# Patient Record
Sex: FEMALE | Race: WHITE | NOT HISPANIC OR LATINO | Employment: OTHER | URBAN - METROPOLITAN AREA
[De-identification: names, ages, dates, MRNs, and addresses within clinical notes are randomized per-mention and may not be internally consistent; named-entity substitution may affect disease eponyms.]

---

## 2023-06-11 ENCOUNTER — APPOINTMENT (OUTPATIENT)
Dept: GENERAL RADIOLOGY | Facility: CLINIC | Age: 28
End: 2023-06-11
Attending: EMERGENCY MEDICINE
Payer: COMMERCIAL

## 2023-06-11 ENCOUNTER — HOSPITAL ENCOUNTER (EMERGENCY)
Facility: CLINIC | Age: 28
Discharge: HOME | End: 2023-06-11
Attending: EMERGENCY MEDICINE | Admitting: EMERGENCY MEDICINE
Payer: COMMERCIAL

## 2023-06-11 VITALS
DIASTOLIC BLOOD PRESSURE: 88 MMHG | HEART RATE: 76 BPM | OXYGEN SATURATION: 98 % | SYSTOLIC BLOOD PRESSURE: 130 MMHG | RESPIRATION RATE: 16 BRPM | TEMPERATURE: 98.5 F

## 2023-06-11 DIAGNOSIS — M25.461 EFFUSION OF RIGHT KNEE: ICD-10-CM

## 2023-06-11 LAB
ALBUMIN SERPL BCG-MCNC: 4.3 G/DL (ref 3.5–5.2)
ALP SERPL-CCNC: 146 U/L (ref 35–104)
ALT SERPL W P-5'-P-CCNC: 116 U/L (ref 10–35)
ANION GAP SERPL CALCULATED.3IONS-SCNC: 11 MMOL/L (ref 7–15)
APPEARANCE FLD: ABNORMAL
AST SERPL W P-5'-P-CCNC: 56 U/L (ref 10–35)
BASOPHILS # BLD AUTO: 0.1 10E3/UL (ref 0–0.2)
BASOPHILS NFR BLD AUTO: 1 %
BILIRUB SERPL-MCNC: 0.3 MG/DL
BUN SERPL-MCNC: 12.4 MG/DL (ref 6–20)
CALCIUM SERPL-MCNC: 9.3 MG/DL (ref 8.6–10)
CELL COUNT BODY FLUID SOURCE: ABNORMAL
CHLORIDE SERPL-SCNC: 100 MMOL/L (ref 98–107)
COLOR FLD: ABNORMAL
CREAT SERPL-MCNC: 0.68 MG/DL (ref 0.51–0.95)
CRP SERPL-MCNC: 6.64 MG/L
CRYSTALS SNV MICRO: NORMAL
DEPRECATED HCO3 PLAS-SCNC: 25 MMOL/L (ref 22–29)
EOSINOPHIL # BLD AUTO: 0.2 10E3/UL (ref 0–0.7)
EOSINOPHIL NFR BLD AUTO: 2 %
ERYTHROCYTE [DISTWIDTH] IN BLOOD BY AUTOMATED COUNT: 12.2 % (ref 10–15)
ERYTHROCYTE [SEDIMENTATION RATE] IN BLOOD BY WESTERGREN METHOD: 17 MM/HR (ref 0–20)
GFR SERPL CREATININE-BSD FRML MDRD: >90 ML/MIN/1.73M2
GLUCOSE BODY FLUID SOURCE: NORMAL
GLUCOSE FLD-MCNC: 106 MG/DL
GLUCOSE SERPL-MCNC: 114 MG/DL (ref 70–99)
GRAM STAIN RESULT: NORMAL
HCT VFR BLD AUTO: 38.4 % (ref 35–47)
HGB BLD-MCNC: 13.1 G/DL (ref 11.7–15.7)
IMM GRANULOCYTES # BLD: 0.2 10E3/UL
IMM GRANULOCYTES NFR BLD: 2 %
LYMPHOCYTES # BLD AUTO: 4 10E3/UL (ref 0.8–5.3)
LYMPHOCYTES NFR BLD AUTO: 36 %
LYMPHOCYTES NFR FLD MANUAL: 21 %
MCH RBC QN AUTO: 30.1 PG (ref 26.5–33)
MCHC RBC AUTO-ENTMCNC: 34.1 G/DL (ref 31.5–36.5)
MCV RBC AUTO: 88 FL (ref 78–100)
MONOCYTES # BLD AUTO: 0.7 10E3/UL (ref 0–1.3)
MONOCYTES NFR BLD AUTO: 7 %
MONOS+MACROS NFR FLD MANUAL: 33 %
NEUTROPHILS # BLD AUTO: 5.9 10E3/UL (ref 1.6–8.3)
NEUTROPHILS NFR BLD AUTO: 52 %
NEUTS BAND NFR FLD MANUAL: 46 %
NRBC # BLD AUTO: 0 10E3/UL
NRBC BLD AUTO-RTO: 0 /100
PLATELET # BLD AUTO: 375 10E3/UL (ref 150–450)
POTASSIUM SERPL-SCNC: 3.4 MMOL/L (ref 3.4–5.3)
PROT FLD-MCNC: 5.6 G/DL
PROT SERPL-MCNC: 7.7 G/DL (ref 6.4–8.3)
PROTEIN BODY FLUID SOURCE: NORMAL
RBC # BLD AUTO: 4.35 10E6/UL (ref 3.8–5.2)
SODIUM SERPL-SCNC: 136 MMOL/L (ref 136–145)
WBC # BLD AUTO: 11.1 10E3/UL (ref 4–11)
WBC # FLD AUTO: 3125 /UL

## 2023-06-11 PROCEDURE — 85652 RBC SED RATE AUTOMATED: CPT | Performed by: EMERGENCY MEDICINE

## 2023-06-11 PROCEDURE — 84157 ASSAY OF PROTEIN OTHER: CPT | Performed by: EMERGENCY MEDICINE

## 2023-06-11 PROCEDURE — 86140 C-REACTIVE PROTEIN: CPT | Performed by: EMERGENCY MEDICINE

## 2023-06-11 PROCEDURE — 96374 THER/PROPH/DIAG INJ IV PUSH: CPT

## 2023-06-11 PROCEDURE — 99284 EMERGENCY DEPT VISIT MOD MDM: CPT | Mod: 25

## 2023-06-11 PROCEDURE — 87015 SPECIMEN INFECT AGNT CONCNTJ: CPT | Performed by: EMERGENCY MEDICINE

## 2023-06-11 PROCEDURE — 89051 BODY FLUID CELL COUNT: CPT | Performed by: EMERGENCY MEDICINE

## 2023-06-11 PROCEDURE — 82945 GLUCOSE OTHER FLUID: CPT | Performed by: EMERGENCY MEDICINE

## 2023-06-11 PROCEDURE — 87205 SMEAR GRAM STAIN: CPT | Performed by: EMERGENCY MEDICINE

## 2023-06-11 PROCEDURE — 89060 EXAM SYNOVIAL FLUID CRYSTALS: CPT | Performed by: EMERGENCY MEDICINE

## 2023-06-11 PROCEDURE — 85025 COMPLETE CBC W/AUTO DIFF WBC: CPT | Performed by: EMERGENCY MEDICINE

## 2023-06-11 PROCEDURE — 87070 CULTURE OTHR SPECIMN AEROBIC: CPT | Performed by: EMERGENCY MEDICINE

## 2023-06-11 PROCEDURE — 250N000011 HC RX IP 250 OP 636: Performed by: EMERGENCY MEDICINE

## 2023-06-11 PROCEDURE — 20611 DRAIN/INJ JOINT/BURSA W/US: CPT | Mod: RT

## 2023-06-11 PROCEDURE — 36415 COLL VENOUS BLD VENIPUNCTURE: CPT | Performed by: EMERGENCY MEDICINE

## 2023-06-11 PROCEDURE — 73562 X-RAY EXAM OF KNEE 3: CPT | Mod: RT

## 2023-06-11 PROCEDURE — 80053 COMPREHEN METABOLIC PANEL: CPT | Performed by: EMERGENCY MEDICINE

## 2023-06-11 RX ORDER — KETOROLAC TROMETHAMINE 15 MG/ML
10 INJECTION, SOLUTION INTRAMUSCULAR; INTRAVENOUS ONCE
Status: COMPLETED | OUTPATIENT
Start: 2023-06-11 | End: 2023-06-11

## 2023-06-11 RX ADMIN — KETOROLAC TROMETHAMINE 10 MG: 15 INJECTION, SOLUTION INTRAMUSCULAR; INTRAVENOUS at 05:04

## 2023-06-11 ASSESSMENT — ACTIVITIES OF DAILY LIVING (ADL)
ADLS_ACUITY_SCORE: 35
ADLS_ACUITY_SCORE: 35

## 2023-06-11 NOTE — ED TRIAGE NOTES
Patient here with right knee pain and swelling which started tonight . She denies falling     Triage Assessment     Row Name 06/11/23 0109       Triage Assessment (Adult)    Airway WDL WDL       Respiratory WDL    Respiratory WDL WDL       Skin Circulation/Temperature WDL    Skin Circulation/Temperature WDL WDL       Cardiac WDL    Cardiac WDL WDL       Peripheral/Neurovascular WDL    Peripheral Neurovascular WDL WDL       Cognitive/Neuro/Behavioral WDL    Cognitive/Neuro/Behavioral WDL WDL

## 2023-06-11 NOTE — ED PROVIDER NOTES
History     Chief Complaint:  Knee Pain     The history is provided by the patient.      Wanda Clarke is a 27 year old female with a history of hyperlipidemia and s/p right knee arthroscopy who presents with right knee pain and swelling. The patient has a history of similar symptoms to the right knee, first 6 years ago and then again 2 years ago. The last time she had these symptoms, she underwent an arthroscopy which came back normal except for an excess amount of fluid. 2 days ago, the patient developed some mild swelling to the knee, however last night, this swelling suddenly increased, causing her to have pain around the area, especially with movement, and prompted her to present to the ED for evaluation. Presently, she denies any recent falls or injuries and has had no symptoms in her left knee. Upon arrival to the ED, nursing staff did notice that the knee was warm to the touch but did not see any redness. The patient is not on blood thinners and does not have any other health problems. She also does not currently see an orthopedist or a rheumatologist.     Independent Historian:   None - Patient Only    Medications:    Nexplanon implant     Past Medical History:    Obesity  Hyperlipidemia     Past Surgical History:    Right knee arthroscopy      Physical Exam     Patient Vitals for the past 24 hrs:   BP Temp Temp src Pulse Resp SpO2   06/11/23 0142 130/88 -- -- 76 16 98 %   06/11/23 0108 (!) 124/102 98.5  F (36.9  C) Oral 88 (!) 200 99 %      Physical Exam  General: Resting on the gurney, appears uncomfortable  Head:  The scalp, face, and head appear normal  Mouth/Throat: Mucus membranes are moist  CV:  Regular rate    Normal S1 and S2  No pathological murmur   Resp:  Breath sounds clear and equal bilaterally    Non-labored, no retractions or accessory muscle use    No coarseness    No wheezing   GI:  Abdomen is soft, no rigidity    No tenderness to palpation  MS:  Normal motor assessment of all  extremities.    Good capillary refill noted.  Skin:  Very swollen right knee. No redness or excess warmth.  Patient is unable to ambulate secondary to pain.  Pain with attempted range of motion.  Neuro:   Speech is normal and fluent. No apparent deficit.  Psych: Awake. Alert.  Normal affect.      Appropriate interactions.    Emergency Department Course     Imaging:  XR Knee Right 3 Views   Final Result   IMPRESSION: The bones are well-mineralized. There is a subtle cortical irregularity of the medial aspect of the patella which may reflect a avulsive injury at the medial patellofemoral retinaculum attachment secondary to transient dislocation. There is a    large suprapatellar joint effusion.         Report per radiology    Laboratory:  Labs Ordered and Resulted from Time of ED Arrival to Time of ED Departure   COMPREHENSIVE METABOLIC PANEL - Abnormal       Result Value    Sodium 136      Potassium 3.4      Chloride 100      Carbon Dioxide (CO2) 25      Anion Gap 11      Urea Nitrogen 12.4      Creatinine 0.68      Calcium 9.3      Glucose 114 (*)     Alkaline Phosphatase 146 (*)     AST 56 (*)      (*)     Protein Total 7.7      Albumin 4.3      Bilirubin Total 0.3      GFR Estimate >90     CRP INFLAMMATION - Abnormal    CRP Inflammation 6.64 (*)    CBC WITH PLATELETS AND DIFFERENTIAL - Abnormal    WBC Count 11.1 (*)     RBC Count 4.35      Hemoglobin 13.1      Hematocrit 38.4      MCV 88      MCH 30.1      MCHC 34.1      RDW 12.2      Platelet Count 375      % Neutrophils 52      % Lymphocytes 36      % Monocytes 7      % Eosinophils 2      % Basophils 1      % Immature Granulocytes 2      NRBCs per 100 WBC 0      Absolute Neutrophils 5.9      Absolute Lymphocytes 4.0      Absolute Monocytes 0.7      Absolute Eosinophils 0.2      Absolute Basophils 0.1      Absolute Immature Granulocytes 0.2      Absolute NRBCs 0.0     ERYTHROCYTE SEDIMENTATION RATE AUTO - Normal    Erythrocyte Sedimentation Rate 17      CRYSTAL ID SYNOVIAL FLUID - Normal    Crystals Analysis No clinically significant crystals seen.     GLUCOSE FLUID    Glucose Fluid Source Knee, Right      Glucose fluid 106     PROTEIN FLUID    Protein Fluid Source Knee, Right      Protein Total Fluid 5.6     GRAM STAIN    Gram Stain Result No organisms seen     AEROBIC BACTERIAL CULTURE ROUTINE      Procedures      Arthrocentesis     Procedure: Arthrocentesis    Indication: Joint Swelling    Consent: Verbal from Patient    Universal Protocol: Universal protocol was followed and time out conducted just prior to starting procedure, confirming patient identity, site/side, procedure, patient position, and availability of correct equipment and implants.     Location: Right Knee    Preparation: Povidone-iodine    Anesthesia/Sedation: Lidocaine - 1%    Procedure Detail: The site was prepped and draped in the usual fashion.  A 19 gauge needle was used via the lateral approach.  50mL joint fluid was withdrawn. The fluid was bloody.    Patient Status: The patient tolerated the procedure well: Yes. There were no complications.    Emergency Department Course & Assessments:  Interventions:  Medications   ketorolac (TORADOL) injection 10 mg (10 mg Intravenous $Given 6/11/23 0504)      Assessments:  0159: I performed an exam of the patient and obtained history, as documented above.   0429: I performed an arthrocentesis as documented above.    Consultations/Discussion of Management or Tests:  None     Disposition:  The patient was discharged to home.     Impression & Plan      Medical Decision Making:  Wanda Clarke is a 27 year old female who presents for evaluation of right knee joint swelling.  There is no a history of trauma.  A broad differential for the swelling was of course considered.      Given lack of redness, warmth, and history/exam I felt the most probable etiology was not septic arthritis, septic bursitis, crystal arthropathy.  Xrays showed a large  suprapatellar joint effusion. Given this, an arthrocentesis was performed given risks/benefits.  The results indicate likely not infectious, more likely hemarthrosis vs rheumatologic etiology.   Will send home with orthopedic follow-up. They understand exact reason for the swelling is unclear and they may need return ED visit, further workup, and/or earlier orthopedic consultation.     Diagnosis:    ICD-10-CM    1. Effusion of right knee  M25.461          Scribe Disclosure:  I, Johanna Jean-Baptiste, am serving as a scribe at 1:56 AM on 6/11/2023 to document services personally performed by Florinda Barry MD based on my observations and the provider's statements to me.      6/11/2023   Florinda Barry MD Debroux, Karah M, MD  06/11/23 0756

## 2023-06-16 LAB — BACTERIA SNV CULT: NO GROWTH

## 2023-06-19 ENCOUNTER — MEDICAL CORRESPONDENCE (OUTPATIENT)
Dept: HEALTH INFORMATION MANAGEMENT | Facility: CLINIC | Age: 28
End: 2023-06-19

## 2023-06-19 ENCOUNTER — LAB (OUTPATIENT)
Dept: LAB | Facility: CLINIC | Age: 28
End: 2023-06-19
Payer: COMMERCIAL

## 2023-06-19 DIAGNOSIS — M25.469 KNEE SWELLING: ICD-10-CM

## 2023-06-19 LAB
CRP SERPL-MCNC: 44.39 MG/L
ERYTHROCYTE [SEDIMENTATION RATE] IN BLOOD BY WESTERGREN METHOD: 25 MM/HR (ref 0–20)
URATE SERPL-MCNC: 4.8 MG/DL (ref 2.4–5.7)

## 2023-06-19 PROCEDURE — 86200 CCP ANTIBODY: CPT

## 2023-06-19 PROCEDURE — 86140 C-REACTIVE PROTEIN: CPT

## 2023-06-19 PROCEDURE — 36415 COLL VENOUS BLD VENIPUNCTURE: CPT

## 2023-06-19 PROCEDURE — 86618 LYME DISEASE ANTIBODY: CPT

## 2023-06-19 PROCEDURE — 84550 ASSAY OF BLOOD/URIC ACID: CPT

## 2023-06-19 PROCEDURE — 85652 RBC SED RATE AUTOMATED: CPT

## 2023-06-19 PROCEDURE — 86431 RHEUMATOID FACTOR QUANT: CPT

## 2023-06-19 PROCEDURE — 86038 ANTINUCLEAR ANTIBODIES: CPT

## 2023-06-20 LAB
ANA SER QL IF: NEGATIVE
B BURGDOR IGG+IGM SER QL: 0.22
RHEUMATOID FACT SER NEPH-ACNC: <7 IU/ML

## 2023-06-21 LAB — CCP AB SER IA-ACNC: 0.5 U/ML

## 2023-06-29 ENCOUNTER — APPOINTMENT (OUTPATIENT)
Dept: ULTRASOUND IMAGING | Facility: CLINIC | Age: 28
End: 2023-06-29
Attending: EMERGENCY MEDICINE
Payer: COMMERCIAL

## 2023-06-29 ENCOUNTER — HOSPITAL ENCOUNTER (EMERGENCY)
Facility: CLINIC | Age: 28
Discharge: HOME | End: 2023-06-30
Attending: EMERGENCY MEDICINE
Payer: COMMERCIAL

## 2023-06-29 ENCOUNTER — APPOINTMENT (OUTPATIENT)
Dept: GENERAL RADIOLOGY | Facility: CLINIC | Age: 28
End: 2023-06-29
Attending: EMERGENCY MEDICINE
Payer: COMMERCIAL

## 2023-06-29 DIAGNOSIS — N61.0 MASTITIS: ICD-10-CM

## 2023-06-29 LAB
ALBUMIN SERPL BCG-MCNC: 4.4 G/DL (ref 3.5–5.2)
ALBUMIN UR-MCNC: NEGATIVE MG/DL
ALP SERPL-CCNC: 129 U/L (ref 35–104)
ALT SERPL W P-5'-P-CCNC: 253 U/L (ref 0–50)
ANION GAP SERPL CALCULATED.3IONS-SCNC: 15 MMOL/L (ref 7–15)
APPEARANCE UR: CLEAR
AST SERPL W P-5'-P-CCNC: 127 U/L (ref 0–45)
BASOPHILS # BLD AUTO: 0 10E3/UL (ref 0–0.2)
BASOPHILS NFR BLD AUTO: 0 %
BILIRUB SERPL-MCNC: 0.8 MG/DL
BILIRUB UR QL STRIP: NEGATIVE
BUN SERPL-MCNC: 13.9 MG/DL (ref 6–20)
CALCIUM SERPL-MCNC: 9.1 MG/DL (ref 8.6–10)
CHLORIDE SERPL-SCNC: 100 MMOL/L (ref 98–107)
COLOR UR AUTO: ABNORMAL
CREAT SERPL-MCNC: 0.52 MG/DL (ref 0.51–0.95)
DEPRECATED HCO3 PLAS-SCNC: 22 MMOL/L (ref 22–29)
EOSINOPHIL # BLD AUTO: 0 10E3/UL (ref 0–0.7)
EOSINOPHIL NFR BLD AUTO: 0 %
ERYTHROCYTE [DISTWIDTH] IN BLOOD BY AUTOMATED COUNT: 12.5 % (ref 10–15)
GFR SERPL CREATININE-BSD FRML MDRD: >90 ML/MIN/1.73M2
GLUCOSE SERPL-MCNC: 125 MG/DL (ref 70–99)
GLUCOSE UR STRIP-MCNC: NEGATIVE MG/DL
HCT VFR BLD AUTO: 39.9 % (ref 35–47)
HGB BLD-MCNC: 13.3 G/DL (ref 11.7–15.7)
HGB UR QL STRIP: ABNORMAL
HOLD SPECIMEN: NORMAL
IMM GRANULOCYTES # BLD: 0.1 10E3/UL
IMM GRANULOCYTES NFR BLD: 1 %
KETONES UR STRIP-MCNC: NEGATIVE MG/DL
LACTATE SERPL-SCNC: 2.1 MMOL/L (ref 0.7–2)
LEUKOCYTE ESTERASE UR QL STRIP: NEGATIVE
LYMPHOCYTES # BLD AUTO: 1.5 10E3/UL (ref 0.8–5.3)
LYMPHOCYTES NFR BLD AUTO: 9 %
MCH RBC QN AUTO: 29.9 PG (ref 26.5–33)
MCHC RBC AUTO-ENTMCNC: 33.3 G/DL (ref 31.5–36.5)
MCV RBC AUTO: 90 FL (ref 78–100)
MONOCYTES # BLD AUTO: 0.4 10E3/UL (ref 0–1.3)
MONOCYTES NFR BLD AUTO: 2 %
MUCOUS THREADS #/AREA URNS LPF: PRESENT /LPF
NEUTROPHILS # BLD AUTO: 14.6 10E3/UL (ref 1.6–8.3)
NEUTROPHILS NFR BLD AUTO: 88 %
NITRATE UR QL: NEGATIVE
NRBC # BLD AUTO: 0 10E3/UL
NRBC BLD AUTO-RTO: 0 /100
PH UR STRIP: 5.5 [PH] (ref 5–7)
PLATELET # BLD AUTO: 271 10E3/UL (ref 150–450)
POTASSIUM SERPL-SCNC: 3.5 MMOL/L (ref 3.4–5.3)
PROT SERPL-MCNC: 7.7 G/DL (ref 6.4–8.3)
RBC # BLD AUTO: 4.45 10E6/UL (ref 3.8–5.2)
RBC URINE: 1 /HPF
SODIUM SERPL-SCNC: 137 MMOL/L (ref 136–145)
SP GR UR STRIP: 1.02 (ref 1–1.03)
SQUAMOUS EPITHELIAL: 2 /HPF
UROBILINOGEN UR STRIP-MCNC: NORMAL MG/DL
WBC # BLD AUTO: 16.7 10E3/UL (ref 4–11)
WBC URINE: 1 /HPF

## 2023-06-29 PROCEDURE — 99285 EMERGENCY DEPT VISIT HI MDM: CPT | Mod: 25

## 2023-06-29 PROCEDURE — 36415 COLL VENOUS BLD VENIPUNCTURE: CPT | Performed by: EMERGENCY MEDICINE

## 2023-06-29 PROCEDURE — 96375 TX/PRO/DX INJ NEW DRUG ADDON: CPT

## 2023-06-29 PROCEDURE — 81001 URINALYSIS AUTO W/SCOPE: CPT | Performed by: EMERGENCY MEDICINE

## 2023-06-29 PROCEDURE — 85025 COMPLETE CBC W/AUTO DIFF WBC: CPT | Performed by: EMERGENCY MEDICINE

## 2023-06-29 PROCEDURE — 83605 ASSAY OF LACTIC ACID: CPT | Performed by: EMERGENCY MEDICINE

## 2023-06-29 PROCEDURE — 87040 BLOOD CULTURE FOR BACTERIA: CPT | Mod: XS | Performed by: EMERGENCY MEDICINE

## 2023-06-29 PROCEDURE — 96361 HYDRATE IV INFUSION ADD-ON: CPT

## 2023-06-29 PROCEDURE — 258N000003 HC RX IP 258 OP 636: Performed by: EMERGENCY MEDICINE

## 2023-06-29 PROCEDURE — 83690 ASSAY OF LIPASE: CPT | Performed by: EMERGENCY MEDICINE

## 2023-06-29 PROCEDURE — 80053 COMPREHEN METABOLIC PANEL: CPT | Performed by: EMERGENCY MEDICINE

## 2023-06-29 PROCEDURE — 71046 X-RAY EXAM CHEST 2 VIEWS: CPT

## 2023-06-29 PROCEDURE — 87637 SARSCOV2&INF A&B&RSV AMP PRB: CPT | Performed by: EMERGENCY MEDICINE

## 2023-06-29 PROCEDURE — 76642 ULTRASOUND BREAST LIMITED: CPT | Mod: LT

## 2023-06-29 PROCEDURE — 250N000011 HC RX IP 250 OP 636: Mod: JZ | Performed by: EMERGENCY MEDICINE

## 2023-06-29 PROCEDURE — 96365 THER/PROPH/DIAG IV INF INIT: CPT | Mod: 59

## 2023-06-29 RX ORDER — MORPHINE SULFATE 4 MG/ML
4 INJECTION, SOLUTION INTRAMUSCULAR; INTRAVENOUS
Status: COMPLETED | OUTPATIENT
Start: 2023-06-29 | End: 2023-06-29

## 2023-06-29 RX ORDER — KETOROLAC TROMETHAMINE 15 MG/ML
15 INJECTION, SOLUTION INTRAMUSCULAR; INTRAVENOUS ONCE
Status: COMPLETED | OUTPATIENT
Start: 2023-06-29 | End: 2023-06-29

## 2023-06-29 RX ORDER — CEFTRIAXONE 1 G/1
1 INJECTION, POWDER, FOR SOLUTION INTRAMUSCULAR; INTRAVENOUS ONCE
Status: COMPLETED | OUTPATIENT
Start: 2023-06-29 | End: 2023-06-29

## 2023-06-29 RX ADMIN — CEFTRIAXONE SODIUM 1 G: 1 INJECTION, POWDER, FOR SOLUTION INTRAMUSCULAR; INTRAVENOUS at 23:08

## 2023-06-29 RX ADMIN — KETOROLAC TROMETHAMINE 15 MG: 15 INJECTION, SOLUTION INTRAMUSCULAR; INTRAVENOUS at 22:56

## 2023-06-29 RX ADMIN — MORPHINE SULFATE 4 MG: 4 INJECTION, SOLUTION INTRAMUSCULAR; INTRAVENOUS at 22:53

## 2023-06-29 RX ADMIN — SODIUM CHLORIDE 1000 ML: 9 INJECTION, SOLUTION INTRAVENOUS at 22:51

## 2023-06-29 ASSESSMENT — ACTIVITIES OF DAILY LIVING (ADL): ADLS_ACUITY_SCORE: 33

## 2023-06-30 ENCOUNTER — APPOINTMENT (OUTPATIENT)
Dept: ULTRASOUND IMAGING | Facility: CLINIC | Age: 28
End: 2023-06-30
Attending: EMERGENCY MEDICINE
Payer: COMMERCIAL

## 2023-06-30 ENCOUNTER — APPOINTMENT (OUTPATIENT)
Dept: CT IMAGING | Facility: CLINIC | Age: 28
End: 2023-06-30
Attending: EMERGENCY MEDICINE
Payer: COMMERCIAL

## 2023-06-30 VITALS
SYSTOLIC BLOOD PRESSURE: 101 MMHG | RESPIRATION RATE: 18 BRPM | HEIGHT: 55 IN | TEMPERATURE: 98 F | BODY MASS INDEX: 36.1 KG/M2 | OXYGEN SATURATION: 97 % | WEIGHT: 156 LBS | DIASTOLIC BLOOD PRESSURE: 69 MMHG | HEART RATE: 124 BPM

## 2023-06-30 LAB
FLUAV RNA SPEC QL NAA+PROBE: NEGATIVE
FLUBV RNA RESP QL NAA+PROBE: NEGATIVE
LACTATE SERPL-SCNC: 2.4 MMOL/L (ref 0.7–2)
LIPASE SERPL-CCNC: 17 U/L (ref 13–60)
RSV RNA SPEC NAA+PROBE: NEGATIVE
SARS-COV-2 RNA RESP QL NAA+PROBE: NEGATIVE

## 2023-06-30 PROCEDURE — 74177 CT ABD & PELVIS W/CONTRAST: CPT

## 2023-06-30 PROCEDURE — 83605 ASSAY OF LACTIC ACID: CPT | Performed by: EMERGENCY MEDICINE

## 2023-06-30 PROCEDURE — 96361 HYDRATE IV INFUSION ADD-ON: CPT

## 2023-06-30 PROCEDURE — 96375 TX/PRO/DX INJ NEW DRUG ADDON: CPT | Mod: 59

## 2023-06-30 PROCEDURE — 250N000009 HC RX 250: Performed by: EMERGENCY MEDICINE

## 2023-06-30 PROCEDURE — 250N000011 HC RX IP 250 OP 636: Performed by: EMERGENCY MEDICINE

## 2023-06-30 PROCEDURE — 250N000011 HC RX IP 250 OP 636: Mod: JZ | Performed by: EMERGENCY MEDICINE

## 2023-06-30 PROCEDURE — 36415 COLL VENOUS BLD VENIPUNCTURE: CPT | Performed by: EMERGENCY MEDICINE

## 2023-06-30 PROCEDURE — 258N000003 HC RX IP 258 OP 636: Performed by: EMERGENCY MEDICINE

## 2023-06-30 PROCEDURE — 76705 ECHO EXAM OF ABDOMEN: CPT

## 2023-06-30 RX ORDER — IOPAMIDOL 755 MG/ML
79 INJECTION, SOLUTION INTRAVASCULAR ONCE
Status: COMPLETED | OUTPATIENT
Start: 2023-06-30 | End: 2023-06-30

## 2023-06-30 RX ORDER — CEPHALEXIN 500 MG/1
500 CAPSULE ORAL 4 TIMES DAILY
Qty: 28 CAPSULE | Refills: 0 | Status: SHIPPED | OUTPATIENT
Start: 2023-06-30 | End: 2023-07-07

## 2023-06-30 RX ORDER — ONDANSETRON 2 MG/ML
4 INJECTION INTRAMUSCULAR; INTRAVENOUS EVERY 30 MIN PRN
Status: DISCONTINUED | OUTPATIENT
Start: 2023-06-30 | End: 2023-06-30 | Stop reason: HOSPADM

## 2023-06-30 RX ADMIN — SODIUM CHLORIDE 1000 ML: 9 INJECTION, SOLUTION INTRAVENOUS at 02:14

## 2023-06-30 RX ADMIN — IOPAMIDOL 79 ML: 755 INJECTION, SOLUTION INTRAVENOUS at 04:31

## 2023-06-30 RX ADMIN — SODIUM CHLORIDE 83 ML: 9 INJECTION, SOLUTION INTRAVENOUS at 04:31

## 2023-06-30 RX ADMIN — ONDANSETRON 4 MG: 2 INJECTION INTRAMUSCULAR; INTRAVENOUS at 01:04

## 2023-06-30 ASSESSMENT — ACTIVITIES OF DAILY LIVING (ADL)
ADLS_ACUITY_SCORE: 35

## 2023-06-30 NOTE — ED TRIAGE NOTES
Triage Assessment     Row Name 06/29/23 1352       Triage Assessment (Adult)    Airway WDL WDL       Respiratory WDL    Respiratory WDL WDL       Skin Circulation/Temperature WDL    Skin Circulation/Temperature WDL WDL       Cardiac WDL    Cardiac WDL WDL       Peripheral/Neurovascular WDL    Peripheral Neurovascular WDL WDL       Cognitive/Neuro/Behavioral WDL    Cognitive/Neuro/Behavioral WDL WDL            Left breast pain starting 6 hours ago. Pain is coming from the left breast. Pt. Is breast feeding and denies discharge from nipple. Concurrent fever and chills, no cough. Last tylenol 3 hours ago.

## 2023-06-30 NOTE — ED PROVIDER NOTES
"  History     Chief Complaint:  Left breast pain and fever     HPI   Wanda Clarke is a 27 year old female presents to the emergency department with left-sided breast pain and fever.  Patient reports that she has been breast-feeding her baby but baby's been suffering from some oral lesions and has not been breast-feeding regularly over the past 48 hours.  However she has continued to be pumping.  She notes pumping the last night once and twice today.  However this evening after putting her child on the bed she began having some severe left-sided breast pain.  She is also had some headache, chills and sensation of fever.  She denies any discharge from the nipple.  Denies any redness.  Denies any cough, difficulty breathing, nausea or vomiting.  She has been taking Tylenol for her fever and pain without significant relief.  She has never had similar episodes like this before in the past.  No issues with breast abscesses previously.  She further denies any abdominal pain, urinary symptoms.    Independent Historian:   None - Patient Only    Medications:    cephALEXin (KEFLEX) 500 MG capsule      Past Medical History:    No past medical history on file.    Past Surgical History:    No past surgical history on file.     Physical Exam     Patient Vitals for the past 24 hrs:   BP Temp Temp src Pulse Resp SpO2 Height Weight   06/30/23 0430 101/69 98  F (36.7  C) Oral -- 18 97 % -- --   06/30/23 0045 109/79 99.2  F (37.3  C) Temporal (!) 124 22 97 % -- --   06/29/23 2129 120/73 100.4  F (38  C) Temporal (!) 124 18 99 % 1.346 m (4' 5\") 70.8 kg (156 lb)      Physical Exam  General: Patient is awake, alert  Head: The scalp, face, and head appear normal  Eyes: The pupils are equal, round, and reactive to light. Conjunctivae and sclerae are normal  Neck: Normal range of motion.   CV: Tachycardic but regular  Resp: Lungs are clear without wheezes or rales. No respiratory distress.   GI: Abdomen is soft, no rigidity, " guarding, or rebound. No distension. No tenderness to palpation in any quadrant.   MS: Normal tone. Joints grossly normal without effusions. No asymmetric leg swelling, calf or thigh tenderness.    Breast exam: Female chaperone present. Left breast: Patient has significant focal tenderness in the medial upper quadrant without significant overlying erythema.  No drainage from the nipple. Right breast normal.   Skin: No rash or lesions noted. Normal capillary refill noted  Neuro: Speech is normal and fluent. Face is symmetric. Moving all extremities.   Psych:  Normal affect.  Appropriate interactions.    Emergency Department Course       Imaging:  CT Chest (PE) Abdomen Pelvis w Contrast   Final Result   IMPRESSION:    1.  No acute abnormality identified in the chest, abdomen or pelvis.   2.  No visualized acute pulmonary embolus.    3. Mild to moderate diffuse fatty infiltration of the liver.         US Breast Left Limited 1-3 Quadrants   Final Result   IMPRESSION:       ACR BI-RADS Category      Results given to the patient.      US Abdomen Limited (RUQ)   Final Result   IMPRESSION:   1.  Diffuse hepatic steatosis.   2.  Otherwise unremarkable exam. No biliary dilatation.            XR Chest 2 Views   Final Result   IMPRESSION: Negative chest.         Report per radiology    Laboratory:  Labs Ordered and Resulted from Time of ED Arrival to Time of ED Departure   COMPREHENSIVE METABOLIC PANEL - Abnormal       Result Value    Sodium 137      Potassium 3.5      Chloride 100      Carbon Dioxide (CO2) 22      Anion Gap 15      Urea Nitrogen 13.9      Creatinine 0.52      Calcium 9.1      Glucose 125 (*)     Alkaline Phosphatase 129 (*)      (*)      (*)     Protein Total 7.7      Albumin 4.4      Bilirubin Total 0.8      GFR Estimate >90     CBC WITH PLATELETS AND DIFFERENTIAL - Abnormal    WBC Count 16.7 (*)     RBC Count 4.45      Hemoglobin 13.3      Hematocrit 39.9      MCV 90      MCH 29.9      MCHC  33.3      RDW 12.5      Platelet Count 271      % Neutrophils 88      % Lymphocytes 9      % Monocytes 2      % Eosinophils 0      % Basophils 0      % Immature Granulocytes 1      NRBCs per 100 WBC 0      Absolute Neutrophils 14.6 (*)     Absolute Lymphocytes 1.5      Absolute Monocytes 0.4      Absolute Eosinophils 0.0      Absolute Basophils 0.0      Absolute Immature Granulocytes 0.1      Absolute NRBCs 0.0     LACTIC ACID WHOLE BLOOD - Abnormal    Lactic Acid 2.1 (*)    URINE MACROSCOPIC WITH REFLEX TO MICRO - Abnormal    Color Urine Light Yellow      Appearance Urine Clear      Glucose Urine Negative      Bilirubin Urine Negative      Ketones Urine Negative      Specific Gravity Urine 1.023      Blood Urine Small (*)     pH Urine 5.5      Protein Albumin Urine Negative      Urobilinogen Urine Normal      Nitrite Urine Negative      Leukocyte Esterase Urine Negative      RBC Urine 1      WBC Urine 1      Squamous Epithelials Urine 2 (*)     Mucus Urine Present (*)    LACTIC ACID WHOLE BLOOD - Abnormal    Lactic Acid 2.4 (*)    INFLUENZA A/B, RSV, & SARS-COV2 PCR - Normal    Influenza A PCR Negative      Influenza B PCR Negative      RSV PCR Negative      SARS CoV2 PCR Negative     LIPASE - Normal    Lipase 17     BLOOD CULTURE   BLOOD CULTURE          Emergency Department Course & Assessments:    Interventions:  Medications   ondansetron (ZOFRAN) injection 4 mg (4 mg Intravenous $Given 6/30/23 0104)   0.9% sodium chloride BOLUS (0 mLs Intravenous Stopped 6/30/23 0106)   ketorolac (TORADOL) injection 15 mg (15 mg Intravenous $Given 6/29/23 2256)   morphine (PF) injection 4 mg (4 mg Intravenous $Given 6/29/23 2253)   cefTRIAXone (ROCEPHIN) 1 g vial to attach to  mL bag for ADULTS or NS 50 mL bag for PEDS (0 g Intravenous Stopped 6/29/23 4624)   0.9% sodium chloride BOLUS (0 mLs Intravenous Stopped 6/30/23 7845)   iopamidol (ISOVUE-370) solution 79 mL (79 mLs Intravenous $Given 6/30/23 9421)   Saline Flush  (83 mLs Intravenous $Given 6/30/23 0434)        Disposition:  The patient was discharged to home.     Impression & Plan      Medical Decision Making:  Patient is a normally healthy 27-year-old female who presents to the emergency department with left breast pain, fever and chills that started several hours ago.  Seems most likely that this is due to mastitis but breast abscess and alternative etiologies were considered. Upon initial evaluation she is tachycardic but otherwise hemodynamically stable.  Broad work-up was initiated to investigate the patient's left upper chest pain.  On physical exam she has some focal tenderness in her left breast in the medial upper quadrant.  Ultrasound was obtained which shows no definitive abscess formation.  Blood work revealed evidence of elevated LFTs.  Right upper quadrant ultrasound was obtained which shows no signs of biliary obstruction but does show hepatic steatosis.  No significant tenderness to her abdomen on exam.  Patient will need follow-up with her primary care physician for further work-up of her elevated liver function test.  In review of her chart it does appear that this is somewhat chronic in nature.  CT scan of the chest and pelvis was obtained which did not show further evidence of pneumonia, pulmonary embolism or additional infectious etiology.  Patient was treated with ceftriaxone in the emergency department and will be sent home on Keflex for mastitis.    Diagnosis:    ICD-10-CM    1. Mastitis  N61.0            Discharge Medications:  Discharge Medication List as of 6/30/2023  5:22 AM      START taking these medications    Details   cephALEXin (KEFLEX) 500 MG capsule Take 1 capsule (500 mg) by mouth 4 times daily for 7 days, Disp-28 capsule, R-0, Local Print              MD Socorro Soto, Fernando Moss MD  06/30/23 4028

## 2023-07-05 LAB
BACTERIA BLD CULT: NO GROWTH
BACTERIA BLD CULT: NO GROWTH

## 2023-09-27 ENCOUNTER — TELEPHONE (OUTPATIENT)
Dept: ORTHOPEDICS | Facility: CLINIC | Age: 28
End: 2023-09-27

## 2023-09-27 ENCOUNTER — TRANSCRIBE ORDERS (OUTPATIENT)
Dept: OTHER | Age: 28
End: 2023-09-27

## 2023-09-27 ENCOUNTER — TRANSFERRED RECORDS (OUTPATIENT)
Dept: HEALTH INFORMATION MANAGEMENT | Facility: CLINIC | Age: 28
End: 2023-09-27

## 2023-09-27 DIAGNOSIS — R22.41 MASS OF KNEE, RIGHT: Primary | ICD-10-CM

## 2023-09-27 DIAGNOSIS — R22.41 KNEE MASS, RIGHT: Primary | ICD-10-CM

## 2023-09-27 NOTE — TELEPHONE ENCOUNTER
Writer received an email referral from Dr. Jacob Guidry for rt knee synovial mass.    Kellee Vang LPN

## 2023-09-29 ENCOUNTER — HOSPITAL ENCOUNTER (INPATIENT)
Dept: GENERAL RADIOLOGY | Facility: CLINIC | Age: 28
Discharge: HOME | End: 2023-09-29
Attending: ORTHOPAEDIC SURGERY
Payer: COMMERCIAL

## 2023-09-29 DIAGNOSIS — R22.41 KNEE MASS, RIGHT: ICD-10-CM

## 2023-09-29 PROCEDURE — 73723 MRI JOINT LWR EXTR W/O&W/DYE: CPT | Mod: 26 | Performed by: RADIOLOGY

## 2023-10-13 NOTE — TELEPHONE ENCOUNTER
Action October 13, 2023 12:00 PM MT   Action Taken Sent a request for imaging from [facility] and [facility].  Sent a request for records from [facility].     Action October 18, 2023 8:19 AM MT   Action Taken [facility] imaging resolved. We have the patient's name spelled incorrectly.   Sent another request for records from [facility].      Action October 18, 2023 9:11 AM MT   Action Taken Called [facility], rep will fax over records STAT.       DIAGNOSIS: RT KNEE SYNOVIAL MASS   APPOINTMENT DATE: 10/18/2023   NOTES STATUS DETAILS   OFFICE NOTE from referring provider In process Jacob Guidry MD - [facility]   OFFICE NOTE from other specialist Care Everywhere 06/26/2023 - Rossi Dee PA-C - [facility]    05/11/2023 - [facility]     DISCHARGE REPORT from the ER Care Everywhere  Internal 06/11/2023 - [facility]  05/11/2023 - [facility]   MEDICATION LIST Care Everywhere    LABS     MRI PACS [facility]:  06/09/2023- RT Knee   ULTRASOUND PACS [facility]:  05/11/2023 - RT Lower Extremity   XRAYS (IMAGES & REPORTS) PACS Internal    [facility]:  05/11/2023 - RT Knee

## 2023-10-18 ENCOUNTER — OFFICE VISIT (OUTPATIENT)
Dept: ORTHOPEDICS | Facility: CLINIC | Age: 28
End: 2023-10-18
Payer: COMMERCIAL

## 2023-10-18 ENCOUNTER — PRE VISIT (OUTPATIENT)
Dept: ORTHOPEDICS | Facility: CLINIC | Age: 28
End: 2023-10-18

## 2023-10-18 ENCOUNTER — TELEPHONE (OUTPATIENT)
Dept: ORTHOPEDICS | Facility: CLINIC | Age: 28
End: 2023-10-18

## 2023-10-18 VITALS — HEIGHT: 57 IN | BODY MASS INDEX: 34.73 KG/M2 | WEIGHT: 161 LBS

## 2023-10-18 DIAGNOSIS — R22.41 MASS OF KNEE, RIGHT: ICD-10-CM

## 2023-10-18 PROCEDURE — 99204 OFFICE O/P NEW MOD 45 MIN: CPT | Performed by: ORTHOPAEDIC SURGERY

## 2023-10-18 NOTE — NURSING NOTE
Pre-Op Teaching was done in person at the clinic.    Teaching Flowsheet   Relevant Diagnosis: Pre-Op Teaching  Teaching Topic:      Person(s) involved in teaching:   Patient and      Motivation Level:  Asks Questions: Yes  Eager to Learn: Yes  Cooperative: Yes  Receptive (willing/able to accept information): Yes  Any cultural factors/Protestant beliefs that may influence understanding or compliance? No     Patient demonstrates understanding of the following:  Reason for the appointment, diagnosis and treatment plan: Yes  Knowledge of proper use of medications and conditions for which they are ordered (with special attention to potential side effects or drug interactions): Yes  Which situations necessitate calling provider and whom to contact: Yes- discussed the stoplight tool to help assist with this.      Teaching Concerns Addressed:      Proper use of surgical scrub explain: Yes    Nutritional needs and diet plan: Yes  Pain management techniques: Yes  Wound Care: Yes  How and/when to access community resources: Yes     Instructional Materials Used/Given:  2 bottle of chlorhexidine and a surgery packet given to patient in clinic.      - Important contact info/ phone numbers: emphasizing clinic number and after hours number  - Map/ location of surgery  - Medications to avoid  - Showering instructions  - Stop light tool    Additionally the following was discussed with patient:  -  will be driving the patient to surgery and staying with them for 24 hours.      -Next step: Schedule a surgery date and schedule a Pre-Op appointment with PAC     Time spent with patient: 15 minutes.

## 2023-10-18 NOTE — TELEPHONE ENCOUNTER
Patient is scheduled for surgery with Dr. Gomez    Spoke with: Wanda    Date of Surgery: 10/26/23    Location: [facility]    Informed patient they will need an adult  Yes    Pre op with Provider PAC    Additional imaging/appointments: POP Made    Surgery packet: Received     Additional comments: N/A        Shital Elizabeth on 10/18/2023 at 4:22 PM

## 2023-10-18 NOTE — NURSING NOTE
"Reason For Visit:   Chief Complaint   Patient presents with    Consult     Right knee synovial mass referred by Dr. Jacob Guidry at [facility]       Ht 1.46 m (4' 9.48\")   Wt 73 kg (161 lb)   BMI 34.26 kg/m      Pain Assessment  Patient Currently in Pain: Yes  0-10 Pain Scale: 5  Primary Pain Location: Knee (right)      Ortega Jansen ATC    "

## 2023-10-18 NOTE — PROGRESS NOTES
4 months ago this patient began to have knee swelling.  She said they aspirated blood from her knee on multiple occasions.  She also had some small incisions in the front of the knee but they did not find anything noteworthy.  Its unclear to me if this was a arthroscopic procedure.  This was done in [place].  About 1 month ago the swelling began to come down and her knee has felt somewhat better.  She still has difficulty straightening it and bending it fully.  She has some discomfort on the lateral aspect of the knee that persists.    On examination she is alert oriented has normal mood and affect and is in no acute distress.  Respirations are regular and unlabored eyes are nonicteric.  The right lower extremity there is no edema or erythema.  She has a small effusion and lacks a few degrees of extension.  She can flex the knee only to about 100 degrees or so without discomfort.  She is wearing neoprene type sleeve.    I reviewed her x-rays and MRI.  She has a calcified mass in the lateral aspect of the tibial plateau adjacent to the knee joint.  On the MRI there is some inflammation in this area.  The patient has a minimal effusion on her MRI from 1 month ago and there may be some small areas of synovial thickening in the knee but no large mass.    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  We will arthroscopically examine the knee for other masses or synovitis and I expect that we will have to make an incision to do an open removal of this mass given its size and location.  I have answered all the patient's questions.  There are small risks of bleeding infection pain numbness stiffness weakness and limp.

## 2023-10-18 NOTE — LETTER
10/18/2023         RE: Wanda Clarke  [id number]  [facility] [place] [address]        Dear Colleague,    Thank you for referring your patient, Wanda Clarke, to the [facility] [place]. Please see a copy of my visit note below.    4 months ago this patient began to have knee swelling.  She said they aspirated blood from her knee on multiple occasions.  She also had some small incisions in the front of the knee but they did not find anything noteworthy.  Its unclear to me if this was a arthroscopic procedure.  This was done in [place].  About 1 month ago the swelling began to come down and her knee has felt somewhat better.  She still has difficulty straightening it and bending it fully.  She has some discomfort on the lateral aspect of the knee that persists.    On examination she is alert oriented has normal mood and affect and is in no acute distress.  Respirations are regular and unlabored eyes are nonicteric.  The right lower extremity there is no edema or erythema.  She has a small effusion and lacks a few degrees of extension.  She can flex the knee only to about 100 degrees or so without discomfort.  She is wearing neoprene type sleeve.    I reviewed her x-rays and MRI.  She has a calcified mass in the lateral aspect of the tibial plateau adjacent to the knee joint.  On the MRI there is some inflammation in this area.  The patient has a minimal effusion on her MRI from 1 month ago and there may be some small areas of synovial thickening in the knee but no large mass.    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  We will arthroscopically examine the knee for other masses or synovitis and I expect that we will have to make an incision to do an open removal of this mass given its size and location.  I have answered all the patient's questions.  There are small risks of bleeding infection pain numbness stiffness  weakness and limp.      Artis Gomez MD

## 2023-10-19 NOTE — TELEPHONE ENCOUNTER
FUTURE VISIT INFORMATION      SURGERY INFORMATION:  Date: 10/26/23  Location:  or  Surgeon:  Artis Gomez MD   Anesthesia Type:  general  Procedure: Right knee arthroscopic examination with possible mass excision right EXCISION, MASS, LOWER EXTREMITY     RECORDS REQUESTED FROM:       Primary Care Provider: [facility]

## 2023-10-20 ENCOUNTER — PRE VISIT (OUTPATIENT)
Dept: SURGERY | Facility: CLINIC | Age: 28
End: 2023-10-20

## 2023-10-20 ENCOUNTER — TELEPHONE (OUTPATIENT)
Dept: SURGERY | Facility: CLINIC | Age: 28
End: 2023-10-20

## 2023-10-20 ENCOUNTER — ANESTHESIA EVENT (OUTPATIENT)
Dept: SURGERY | Facility: AMBULATORY SURGERY CENTER | Age: 28
End: 2023-10-20
Payer: COMMERCIAL

## 2023-10-20 ENCOUNTER — LAB (OUTPATIENT)
Dept: LAB | Facility: CLINIC | Age: 28
End: 2023-10-20
Payer: COMMERCIAL

## 2023-10-20 ENCOUNTER — OFFICE VISIT (OUTPATIENT)
Dept: SURGERY | Facility: CLINIC | Age: 28
End: 2023-10-20
Payer: COMMERCIAL

## 2023-10-20 VITALS
OXYGEN SATURATION: 99 % | HEART RATE: 98 BPM | SYSTOLIC BLOOD PRESSURE: 123 MMHG | TEMPERATURE: 98.5 F | BODY MASS INDEX: 33.8 KG/M2 | RESPIRATION RATE: 16 BRPM | DIASTOLIC BLOOD PRESSURE: 85 MMHG | WEIGHT: 161 LBS | HEIGHT: 58 IN

## 2023-10-20 DIAGNOSIS — Z01.818 PREOP EXAMINATION: Primary | ICD-10-CM

## 2023-10-20 DIAGNOSIS — R79.89 ELEVATED LFTS: ICD-10-CM

## 2023-10-20 DIAGNOSIS — R22.41 MASS OF KNEE, RIGHT: ICD-10-CM

## 2023-10-20 DIAGNOSIS — Z01.818 PREOP EXAMINATION: ICD-10-CM

## 2023-10-20 LAB
ALBUMIN SERPL BCG-MCNC: 4.6 G/DL (ref 3.5–5.2)
ALP SERPL-CCNC: 118 U/L (ref 35–104)
ALT SERPL W P-5'-P-CCNC: 169 U/L (ref 0–50)
ANION GAP SERPL CALCULATED.3IONS-SCNC: 11 MMOL/L (ref 7–15)
AST SERPL W P-5'-P-CCNC: 106 U/L (ref 0–45)
BILIRUB SERPL-MCNC: 0.8 MG/DL
BUN SERPL-MCNC: 14 MG/DL (ref 6–20)
CALCIUM SERPL-MCNC: 9.2 MG/DL (ref 8.6–10)
CHLORIDE SERPL-SCNC: 104 MMOL/L (ref 98–107)
CREAT SERPL-MCNC: 0.54 MG/DL (ref 0.51–0.95)
DEPRECATED HCO3 PLAS-SCNC: 24 MMOL/L (ref 22–29)
EGFRCR SERPLBLD CKD-EPI 2021: >90 ML/MIN/1.73M2
ERYTHROCYTE [DISTWIDTH] IN BLOOD BY AUTOMATED COUNT: 12.3 % (ref 10–15)
GLUCOSE SERPL-MCNC: 111 MG/DL (ref 70–99)
HCT VFR BLD AUTO: 40.5 % (ref 35–47)
HGB BLD-MCNC: 14.2 G/DL (ref 11.7–15.7)
INR PPP: 1.1 (ref 0.85–1.15)
MCH RBC QN AUTO: 30 PG (ref 26.5–33)
MCHC RBC AUTO-ENTMCNC: 35.1 G/DL (ref 31.5–36.5)
MCV RBC AUTO: 86 FL (ref 78–100)
PLATELET # BLD AUTO: 323 10E3/UL (ref 150–450)
POTASSIUM SERPL-SCNC: 3.6 MMOL/L (ref 3.4–5.3)
PROT SERPL-MCNC: 7.8 G/DL (ref 6.4–8.3)
RBC # BLD AUTO: 4.73 10E6/UL (ref 3.8–5.2)
SODIUM SERPL-SCNC: 139 MMOL/L (ref 135–145)
WBC # BLD AUTO: 9.9 10E3/UL (ref 4–11)

## 2023-10-20 PROCEDURE — 85610 PROTHROMBIN TIME: CPT | Performed by: PATHOLOGY

## 2023-10-20 PROCEDURE — 99203 OFFICE O/P NEW LOW 30 MIN: CPT | Performed by: CLINICAL NURSE SPECIALIST

## 2023-10-20 PROCEDURE — 36415 COLL VENOUS BLD VENIPUNCTURE: CPT | Performed by: PATHOLOGY

## 2023-10-20 PROCEDURE — 85027 COMPLETE CBC AUTOMATED: CPT | Performed by: PATHOLOGY

## 2023-10-20 PROCEDURE — 80053 COMPREHEN METABOLIC PANEL: CPT | Performed by: PATHOLOGY

## 2023-10-20 ASSESSMENT — PAIN SCALES - GENERAL: PAINLEVEL: NO PAIN (0)

## 2023-10-20 ASSESSMENT — LIFESTYLE VARIABLES: TOBACCO_USE: 0

## 2023-10-20 ASSESSMENT — ENCOUNTER SYMPTOMS
SEIZURES: 0
DYSRHYTHMIAS: 0

## 2023-10-20 NOTE — TELEPHONE ENCOUNTER
"Using a  over the phone, updated Wanda of her lab results.  Per Madyson Mireles, CNS: \"her liver function labs were elevated, but improved from June. Encourage her to follow with a primary provider in the future.\"  Wanda expressed understanding.  Laurence Bella, RN    "

## 2023-10-20 NOTE — H&P
Pre-Operative H & P     CC:  Preoperative exam to assess for increased cardiopulmonary risk while undergoing surgery and anesthesia.    Date of Encounter: 10/20/2023  Primary Care Physician:  No Ref-Primary, Physician     Reason for visit:   Encounter Diagnoses   Name Primary?    Preop examination Yes    Mass of knee, right     Elevated LFTs        HPI  Wanda Clarke is a 28 year old female who presents for pre-operative H & P in preparation for  Procedure Information       Case: [id number] Date/Time: 10/26/23 1355    Procedures:       Right knee arthroscopic examination with possible mass excision right (Right: Knee)      EXCISION, MASS, LOWER EXTREMITY (Right: Leg)    Anesthesia type: General    Diagnosis: Mass of knee, right [R22.41]    Pre-op diagnosis: Mass of knee, right [R22.41]    Location: [place] [address] / [facility]    Providers: Artis Gomez MD          History is obtained from the patient and  via phone , and chart review    Patient who was recently evaluated by Dr. Gomez with concern for right knee swelling beginning approximately 4 months ago. She had aspirations of blood from her knee on multiple occasions in [place]. She also had some type of procedure (arthroscopy) involving small incisions but reportedly nothing significant was found. Approximately one month ago, swelling improved, however she still has difficulty straightening and bending the knee fully. She has some discomfort on the lateral aspect of the knee that persists.    Her MRI was reviewed showing some inflammation involving the knee joint. Per notes she has a calcified mass in the lateral aspect of the tibial plateau adjacent to the knee joint. She has been counseled for above procedures    Patient is breastfeeding. She was treated for left breast mastitis approximately 2 weeks ago.     Hx of abnormal bleeding or anti-platelet use: Denies      Menstrual history: No LMP recorded. Patient has had an implant.     Past Medical History  Past Medical History:   Diagnosis Date    Fatty liver     Mass of right knee     PONV (postoperative nausea and vomiting)        Past Surgical History  Past Surgical History:   Procedure Laterality Date    ARTHROSCOPY KNEE      6-7 years ago       Prior to Admission Medications  No current outpatient medications on file.       Allergies  No Known Allergies    Social History  Social History     Socioeconomic History    Marital status:      Spouse name: Not on file    Number of children: Not on file    Years of education: Not on file    Highest education level: Not on file   Occupational History    Not on file   Tobacco Use    Smoking status: Never    Smokeless tobacco: Never   Substance and Sexual Activity    Alcohol use: Never    Drug use: Never    Sexual activity: Not on file   Other Topics Concern    Not on file   Social History Narrative    Not on file     Social Determinants of Health     Financial Resource Strain: Not on file   Food Insecurity: Not on file   Transportation Needs: Not on file   Physical Activity: Not on file   Stress: Not on file   Social Connections: Not on file   Interpersonal Safety: Not on file   Housing Stability: Not on file       Family History  Family History   Problem Relation Age of Onset    Hypertension Father     Anesthesia Reaction No family hx of     Clotting Disorder No family hx of        Review of Systems  The complete review of systems is negative other than noted in the HPI or here.   Anesthesia Evaluation   Pt has had prior anesthetic. Type: General and Regional (Epidural for delivery).    History of anesthetic complications  - PONV.      ROS/MED HX  ENT/Pulmonary:    (-) tobacco use and recent URI   Neurologic:    (-) no seizures   Cardiovascular:     (+)  - -   -  - -                                 No previous cardiac testing  (-) taking anticoagulants/antiplatelets, LYN  "and arrhythmias   METS/Exercise Tolerance: >4 METS Comment: Activity more limited recently due to knee pain   Hematologic:     (+)       history of blood transfusion, no previous transfusion reaction,     (-) history of blood clots   Musculoskeletal: Comment: Right knee pain/possible mass      GI/Hepatic: Comment: Fatty liver on imaging    (+)             liver disease,    (-) GERD   Renal/Genitourinary:  - neg Renal ROS     Endo: Comment: A1C 5.3    (+)               Obesity,       Psychiatric/Substance Use:  - neg psychiatric ROS     Infectious Disease:  - neg infectious disease ROS     Malignancy:  - neg malignancy ROS     Other:  - neg other ROS          /85 (BP Location: Right arm, Patient Position: Sitting, Cuff Size: Adult Regular)   Pulse 98   Temp 98.5  F (36.9  C) (Oral)   Resp 16   Ht 1.473 m (4' 10\")   Wt 73 kg (161 lb)   SpO2 99%   Breastfeeding Yes   BMI 33.65 kg/m      Physical Exam   Constitutional: Awake, alert, cooperative, no apparent distress, and appears stated age. Accompanied by  and daughter.  Eyes: Pupils equal, round and reactive to light, extra ocular muscles intact, sclera clear, conjunctiva normal.  HENT: Normocephalic, oral pharynx with moist mucus membranes, good dentition. No goiter appreciated.   Respiratory: Clear to auscultation bilaterally, no crackles or wheezing. No cough or obvious dyspnea.  Cardiovascular: Regular rate and rhythm, normal S1 and S2, and no murmur noted. Carotids +2, no bruits. No edema. Palpable pulses to radial  DP and PT arteries.   GI: Normal bowel sounds, soft, non-distended, non-tender, no masses palpated, no hepatosplenomegaly.    Lymph/Hematologic: No cervical lymphadenopathy and no supraclavicular lymphadenopathy.  Genitourinary:  Deferred  Skin: Warm and dry.  No rashes at anticipated surgical site.   Musculoskeletal: Full ROM of neck. There is no redness, warmth, or swelling of the joints. Gross motor strength is normal.  "   Neurologic: Awake, alert, oriented to name, place and time. Cranial nerves II-XII are grossly intact. Gait is normal.   Neuropsychiatric: Calm, cooperative. Normal affect.     Prior Labs/Diagnostic Studies   All labs and imaging personally reviewed   Lab Results   Component Value Date    WBC 16.7 06/29/2023     Lab Results   Component Value Date    RBC 4.45 06/29/2023     Lab Results   Component Value Date    HGB 13.3 06/29/2023     Lab Results   Component Value Date    HCT 39.9 06/29/2023     Lab Results   Component Value Date    MCV 90 06/29/2023     Lab Results   Component Value Date    MCH 29.9 06/29/2023     Lab Results   Component Value Date    MCHC 33.3 06/29/2023     Lab Results   Component Value Date    RDW 12.5 06/29/2023     Lab Results   Component Value Date     06/29/2023     Last Comprehensive Metabolic Panel:  Sodium   Date Value Ref Range Status   06/29/2023 137 136 - 145 mmol/L Final     Potassium   Date Value Ref Range Status   06/29/2023 3.5 3.4 - 5.3 mmol/L Final     Chloride   Date Value Ref Range Status   06/29/2023 100 98 - 107 mmol/L Final     Carbon Dioxide (CO2)   Date Value Ref Range Status   06/29/2023 22 22 - 29 mmol/L Final     Anion Gap   Date Value Ref Range Status   06/29/2023 15 7 - 15 mmol/L Final     Glucose   Date Value Ref Range Status   06/29/2023 125 (H) 70 - 99 mg/dL Final     Urea Nitrogen   Date Value Ref Range Status   06/29/2023 13.9 6.0 - 20.0 mg/dL Final     Creatinine   Date Value Ref Range Status   06/29/2023 0.52 0.51 - 0.95 mg/dL Final     GFR Estimate   Date Value Ref Range Status   06/29/2023 >90 >60 mL/min/1.73m2 Final     Calcium   Date Value Ref Range Status   06/29/2023 9.1 8.6 - 10.0 mg/dL Final     Bilirubin Total   Date Value Ref Range Status   06/29/2023 0.8 <=1.2 mg/dL Final     Alkaline Phosphatase   Date Value Ref Range Status   06/29/2023 129 (H) 35 - 104 U/L Final     ALT   Date Value Ref Range Status   06/29/2023 253 (H) 0 - 50 U/L Final      Comment:     Reference intervals for this test were updated on 6/12/2023 to more accurately reflect our healthy population. There may be differences in the flagging of prior results with similar values performed with this method. Interpretation of those prior results can be made in the context of the updated reference intervals.       AST   Date Value Ref Range Status   06/29/2023 127 (H) 0 - 45 U/L Final     Comment:     Reference intervals for this test were updated on 6/12/2023 to more accurately reflect our healthy population. There may be differences in the flagging of prior results with similar values performed with this method. Interpretation of those prior results can be made in the context of the updated reference intervals.     EKG: Not indicated    MR King Overread 9/29/23                                                                     IMPRESSION:      1. Calcific focus along the tibiofibular articulation measuring 1.6 cm  with associated synovial thickening and enhancement, likely a joint  body extending along the popliteus myotendinous junction. This focus  appears ossific on radiographs. Enhancement on sagittal series 12  image 24 suggests adjacent synovitis, possibly reactive.      2. 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    CT Chest PE 6/30/23                                                                     IMPRESSION:   1.  No acute abnormality identified in the chest, abdomen or pelvis.  2.  No visualized acute pulmonary embolus.   3. Mild to moderate diffuse fatty infiltration of the liver.     The patient's records and results personally reviewed by this provider.     Outside records reviewed from: Care Everywhere    LAB/DIAGNOSTIC STUDIES TODAY:    Lab Results   Component Value Date    WBC 9.9 10/20/2023     Lab Results   Component Value Date    RBC 4.73 10/20/2023     Lab Results   Component Value Date    HGB 14.2 10/20/2023     Lab  "Results   Component Value Date    HCT 40.5 10/20/2023     Lab Results   Component Value Date    MCV 86 10/20/2023     Lab Results   Component Value Date    MCH 30.0 10/20/2023     Lab Results   Component Value Date    MCHC 35.1 10/20/2023     Lab Results   Component Value Date    RDW 12.3 10/20/2023     Lab Results   Component Value Date     10/20/2023     Last Comprehensive Metabolic Panel:  Lab Results   Component Value Date     10/20/2023    POTASSIUM 3.6 10/20/2023    CHLORIDE 104 10/20/2023    CO2 24 10/20/2023    ANIONGAP 11 10/20/2023     (H) 10/20/2023    BUN 14.0 10/20/2023    CR 0.54 10/20/2023    GFRESTIMATED >90 10/20/2023    JOSEFA 9.2 10/20/2023     Lab Results   Component Value Date     10/20/2023     Lab Results   Component Value Date     10/20/2023     No results found for: \"BILICONJ\"   Lab Results   Component Value Date    BILITOTAL 0.8 10/20/2023     Lab Results   Component Value Date    ALBUMIN 4.6 10/20/2023     Lab Results   Component Value Date    PROTTOTAL 7.8 10/20/2023      Lab Results   Component Value Date    ALKPHOS 118 10/20/2023       INR 1.10  Assessment    Wanda Clarke is a 28 year old female seen as a PAC referral for risk assessment and optimization for anesthesia.    Plan/Recommendations  Pt will be optimized for the proposed procedure.  See below for details on the assessment, risk, and preoperative recommendations    NEUROLOGY  - No history of TIA, CVA or seizure    -Post Op delirium risk factors:  No risk identified    ENT  - No current airway concerns.  Will need to be reassessed day of surgery.  Mallampati: I  TM: > 3    CARDIAC  No known cardiac history, symptoms or meds. Good activity tolerance, but now more limited due to knee pain  - METS (Metabolic Equivalents)>4    RCRI: 0.4% risk of serious cardiac events    PULMONARY  Denies asthma, cough or shortness of breath  Low risk for MANUELITO  - Tobacco History    History   Smoking Status " "   Never   Smokeless Tobacco    Never       GI: Occasional reflux/heartburn related to certain foods. No specific meds.   Fatty liver on imaging.   Elevated LFTs in 6/2023, noted since 2020  Today still elevated but somewhat improved from last  Alk Phos 118      INR 1.10    Patient will be informed of results and encourage her to continue to follow up with a primary provider  PONV High Risk  Total Score: 3           1 AN PONV: Pt is Female    1 AN PONV: Patient is not a current smoker    1 AN PONV: Patient has history of PONV      PONV. Significant history. Final decisions regarding prophylaxis by Anesthesia on DOS.    /RENAL  - Baseline Creatinine  0.54    ENDOCRINE    - BMI: Estimated body mass index is 33.65 kg/m  as calculated from the following:    Height as of this encounter: 1.473 m (4' 10\").    Weight as of this encounter: 73 kg (161 lb).  Obesity (BMI >30)  - No history of Diabetes Mellitus 3/2/23 A1C 5.3    HEME  VTE Low Risk 0.26%            Total Score: 0      Denies personal or family history of blood clots  Denies history of blood transfusion     Will require     Different anesthesia methods/types have been discussed with the patient, but they are aware that the final plan will be decided by the assigned anesthesia provider on the date of service.  Patient was discussed with Dr Pryor    The patient is optimized for their procedure. AVS with information on surgery time/arrival time, meds and NPO status given by nursing staff. No further diagnostic testing indicated.      On the day of service:     Prep time: 12 minutes  Visit time: 16 minutes  Documentation time: 14 minutes  ------------------------------------------  Total time: 42 minutes      ODALYS Martins CNS  Preoperative Assessment Center  [facility]  [facility] and [facility]  Phone: [phone]  Fax: [phone]    "

## 2023-10-20 NOTE — PATIENT INSTRUCTIONS
Preparing for Your Surgery      Name:  Wanda Clarke   MRN:  [id number]   :  1995   Today's Date:  10/20/2023         Arriving for surgery:  Surgery date:  10/26/23  Arrival time:  12:30 pm   Surgery time: 2:00 pm     Restrictions due to COVID 19:    Please maintain social distance.  Masking is optional        parking is available for anyone with mobility limitations or disabilities. (Monday- Friday 7 am- 5 pm)    Please come to:    [facility]  [address] [place] [id number]    Check in on the 5th floor, Ambulatory Surgery Center.    What can I eat or drink?    -  You may eat and drink normally until 8 hours before arrival time  (Until 4:30 am on 10/26/23)  -  You may have clear liquids until 2 hours before arrival time  (Until 10:30 am on 10/26/23)    Examples of clear liquids:  Water  Clear broth  Juices (apple, white grape, white cranberry  and cider) without pulp  Noncarbonated, powder based beverages  (lemonade and Nikhil-Aid)  Sodas (Sprite, 7-Up, ginger ale and seltzer)  Coffee or tea (without milk or cream)  Gatorade    --No alcohol or cannabis products for at least 24 hours before surgery    Which medicines can I take?      Hold Ibuprofen (Advil, Motrin) for 1 day before surgery--unless otherwise directed by surgeon.  Hold Naproxen (Aleve) for 4 days before surgery.        How do I prepare myself?  - Please take 2 showers (one the night prior to surgery and one the morning of surgery) using Scrubcare or Hibiclens soap.    Use this soap only from the neck to your toes.     Leave the soap on your skin for one minute--then rinse thoroughly.      You may use your own shampoo and conditioner; no other hair products.   - Please remove all jewelry and body piercings.  - No lotions, deodorants or fragrance.  - No makeup or fingernail polish.   - Bring your ID and insurance card.    -If you have a Deep Brain Stimulator, a Spinal Cord Stimulator or any  implanted Neuro device you must bring the remote to the Surgery Center          ALL PATIENTS ARE REQUIRED TO HAVE A RESPONSIBLE ADULT TO DRIVE AND BE IN ATTENDANCE WITH THEM FOR 24 HOURS FOLLOWING SURGERY       Covid testing policy as of 12/06/2022  Your surgeon will notify and schedule you for a COVID test if one is needed before surgery--please direct any questions or COVID symptoms to your surgeon      Questions or Concerns:    -For questions regarding the day of surgery please contact the Ambulatory Surgery Center at [phone].    -If you have health changes between today and your surgery please contact your surgeon.     For questions after surgery please call your surgeons office.

## 2023-10-25 ASSESSMENT — ENCOUNTER SYMPTOMS
DYSRHYTHMIAS: 0
SEIZURES: 0

## 2023-10-25 ASSESSMENT — LIFESTYLE VARIABLES: TOBACCO_USE: 0

## 2023-10-26 ENCOUNTER — HOSPITAL ENCOUNTER (OUTPATIENT)
Facility: AMBULATORY SURGERY CENTER | Age: 28
Discharge: HOME | End: 2023-10-26
Attending: ORTHOPAEDIC SURGERY
Payer: COMMERCIAL

## 2023-10-26 ENCOUNTER — ANESTHESIA (OUTPATIENT)
Dept: SURGERY | Facility: AMBULATORY SURGERY CENTER | Age: 28
End: 2023-10-26
Payer: COMMERCIAL

## 2023-10-26 VITALS
OXYGEN SATURATION: 97 % | WEIGHT: 161 LBS | HEART RATE: 75 BPM | HEIGHT: 58 IN | RESPIRATION RATE: 16 BRPM | TEMPERATURE: 97.1 F | SYSTOLIC BLOOD PRESSURE: 111 MMHG | BODY MASS INDEX: 33.8 KG/M2 | DIASTOLIC BLOOD PRESSURE: 82 MMHG

## 2023-10-26 DIAGNOSIS — R22.41 MASS OF KNEE, RIGHT: Primary | ICD-10-CM

## 2023-10-26 LAB
HCG UR QL: NEGATIVE
INTERNAL QC OK POCT: NORMAL
POCT KIT EXPIRATION DATE: NORMAL
POCT KIT LOT NUMBER: NORMAL

## 2023-10-26 PROCEDURE — 81025 URINE PREGNANCY TEST: CPT | Performed by: PATHOLOGY

## 2023-10-26 PROCEDURE — 88305 TISSUE EXAM BY PATHOLOGIST: CPT | Mod: 26 | Performed by: PATHOLOGY

## 2023-10-26 PROCEDURE — 88307 TISSUE EXAM BY PATHOLOGIST: CPT | Mod: 26 | Performed by: PATHOLOGY

## 2023-10-26 PROCEDURE — 27372 REMOVAL OF FOREIGN BODY: CPT | Mod: 51,XS,RT | Performed by: ORTHOPAEDIC SURGERY

## 2023-10-26 PROCEDURE — 29876 ARTHRS KNEE SURG SYNVCT MAJ: CPT | Mod: RT | Performed by: ORTHOPAEDIC SURGERY

## 2023-10-26 PROCEDURE — 88311 DECALCIFY TISSUE: CPT | Mod: 26 | Performed by: PATHOLOGY

## 2023-10-26 PROCEDURE — 88311 DECALCIFY TISSUE: CPT | Mod: TC | Performed by: ORTHOPAEDIC SURGERY

## 2023-10-26 PROCEDURE — 88305 TISSUE EXAM BY PATHOLOGIST: CPT | Mod: TC | Performed by: ORTHOPAEDIC SURGERY

## 2023-10-26 RX ORDER — LIDOCAINE 40 MG/G
CREAM TOPICAL
Status: DISCONTINUED | OUTPATIENT
Start: 2023-10-26 | End: 2023-10-26 | Stop reason: HOSPADM

## 2023-10-26 RX ORDER — HYDROMORPHONE HYDROCHLORIDE 1 MG/ML
0.4 INJECTION, SOLUTION INTRAMUSCULAR; INTRAVENOUS; SUBCUTANEOUS EVERY 5 MIN PRN
Status: DISCONTINUED | OUTPATIENT
Start: 2023-10-26 | End: 2023-10-26 | Stop reason: HOSPADM

## 2023-10-26 RX ORDER — HYDROMORPHONE HYDROCHLORIDE 1 MG/ML
0.2 INJECTION, SOLUTION INTRAMUSCULAR; INTRAVENOUS; SUBCUTANEOUS EVERY 5 MIN PRN
Status: DISCONTINUED | OUTPATIENT
Start: 2023-10-26 | End: 2023-10-26 | Stop reason: HOSPADM

## 2023-10-26 RX ORDER — LIDOCAINE HYDROCHLORIDE 20 MG/ML
INJECTION, SOLUTION INFILTRATION; PERINEURAL PRN
Status: DISCONTINUED | OUTPATIENT
Start: 2023-10-26 | End: 2023-10-26

## 2023-10-26 RX ORDER — ACETAMINOPHEN 325 MG/1
975 TABLET ORAL EVERY 8 HOURS PRN
Qty: 50 TABLET | Refills: 0 | Status: SHIPPED | OUTPATIENT
Start: 2023-10-26

## 2023-10-26 RX ORDER — PROPOFOL 10 MG/ML
INJECTION, EMULSION INTRAVENOUS PRN
Status: DISCONTINUED | OUTPATIENT
Start: 2023-10-26 | End: 2023-10-26

## 2023-10-26 RX ORDER — KETOROLAC TROMETHAMINE 30 MG/ML
INJECTION, SOLUTION INTRAMUSCULAR; INTRAVENOUS PRN
Status: DISCONTINUED | OUTPATIENT
Start: 2023-10-26 | End: 2023-10-26

## 2023-10-26 RX ORDER — CEFAZOLIN SODIUM 2 G/50ML
2 SOLUTION INTRAVENOUS
Status: COMPLETED | OUTPATIENT
Start: 2023-10-26 | End: 2023-10-26

## 2023-10-26 RX ORDER — ONDANSETRON 4 MG/1
4 TABLET, ORALLY DISINTEGRATING ORAL EVERY 30 MIN PRN
Status: DISCONTINUED | OUTPATIENT
Start: 2023-10-26 | End: 2023-10-27 | Stop reason: HOSPADM

## 2023-10-26 RX ORDER — OXYCODONE HYDROCHLORIDE 5 MG/1
5 TABLET ORAL
Status: COMPLETED | OUTPATIENT
Start: 2023-10-26 | End: 2023-10-26

## 2023-10-26 RX ORDER — APREPITANT 40 MG/1
40 CAPSULE ORAL ONCE
Status: COMPLETED | OUTPATIENT
Start: 2023-10-26 | End: 2023-10-26

## 2023-10-26 RX ORDER — BUPIVACAINE HYDROCHLORIDE 2.5 MG/ML
INJECTION, SOLUTION INFILTRATION; PERINEURAL PRN
Status: DISCONTINUED | OUTPATIENT
Start: 2023-10-26 | End: 2023-10-26 | Stop reason: HOSPADM

## 2023-10-26 RX ORDER — IBUPROFEN 600 MG/1
600 TABLET, FILM COATED ORAL EVERY 6 HOURS PRN
Qty: 40 TABLET | Refills: 0 | Status: SHIPPED | OUTPATIENT
Start: 2023-10-26

## 2023-10-26 RX ORDER — OXYCODONE HYDROCHLORIDE 5 MG/1
10 TABLET ORAL
Status: DISCONTINUED | OUTPATIENT
Start: 2023-10-26 | End: 2023-10-27 | Stop reason: HOSPADM

## 2023-10-26 RX ORDER — PROPOFOL 10 MG/ML
INJECTION, EMULSION INTRAVENOUS CONTINUOUS PRN
Status: DISCONTINUED | OUTPATIENT
Start: 2023-10-26 | End: 2023-10-26

## 2023-10-26 RX ORDER — CEFAZOLIN SODIUM 2 G/50ML
2 SOLUTION INTRAVENOUS SEE ADMIN INSTRUCTIONS
Status: DISCONTINUED | OUTPATIENT
Start: 2023-10-26 | End: 2023-10-26 | Stop reason: HOSPADM

## 2023-10-26 RX ORDER — SODIUM CHLORIDE, SODIUM LACTATE, POTASSIUM CHLORIDE, CALCIUM CHLORIDE 600; 310; 30; 20 MG/100ML; MG/100ML; MG/100ML; MG/100ML
INJECTION, SOLUTION INTRAVENOUS CONTINUOUS
Status: DISCONTINUED | OUTPATIENT
Start: 2023-10-26 | End: 2023-10-26 | Stop reason: HOSPADM

## 2023-10-26 RX ORDER — ONDANSETRON 2 MG/ML
INJECTION INTRAMUSCULAR; INTRAVENOUS PRN
Status: DISCONTINUED | OUTPATIENT
Start: 2023-10-26 | End: 2023-10-26

## 2023-10-26 RX ORDER — ONDANSETRON 2 MG/ML
4 INJECTION INTRAMUSCULAR; INTRAVENOUS EVERY 30 MIN PRN
Status: DISCONTINUED | OUTPATIENT
Start: 2023-10-26 | End: 2023-10-27 | Stop reason: HOSPADM

## 2023-10-26 RX ORDER — ONDANSETRON 2 MG/ML
4 INJECTION INTRAMUSCULAR; INTRAVENOUS EVERY 30 MIN PRN
Status: DISCONTINUED | OUTPATIENT
Start: 2023-10-26 | End: 2023-10-26 | Stop reason: HOSPADM

## 2023-10-26 RX ORDER — ACETAMINOPHEN 325 MG/1
975 TABLET ORAL ONCE
Status: COMPLETED | OUTPATIENT
Start: 2023-10-26 | End: 2023-10-26

## 2023-10-26 RX ORDER — ONDANSETRON 4 MG/1
4 TABLET, ORALLY DISINTEGRATING ORAL EVERY 30 MIN PRN
Status: DISCONTINUED | OUTPATIENT
Start: 2023-10-26 | End: 2023-10-26 | Stop reason: HOSPADM

## 2023-10-26 RX ORDER — FENTANYL CITRATE 50 UG/ML
25 INJECTION, SOLUTION INTRAMUSCULAR; INTRAVENOUS EVERY 5 MIN PRN
Status: DISCONTINUED | OUTPATIENT
Start: 2023-10-26 | End: 2023-10-26 | Stop reason: HOSPADM

## 2023-10-26 RX ORDER — FENTANYL CITRATE 50 UG/ML
INJECTION, SOLUTION INTRAMUSCULAR; INTRAVENOUS PRN
Status: DISCONTINUED | OUTPATIENT
Start: 2023-10-26 | End: 2023-10-26

## 2023-10-26 RX ORDER — DEXAMETHASONE SODIUM PHOSPHATE 4 MG/ML
INJECTION, SOLUTION INTRA-ARTICULAR; INTRALESIONAL; INTRAMUSCULAR; INTRAVENOUS; SOFT TISSUE PRN
Status: DISCONTINUED | OUTPATIENT
Start: 2023-10-26 | End: 2023-10-26

## 2023-10-26 RX ORDER — OXYCODONE HYDROCHLORIDE 5 MG/1
5 TABLET ORAL EVERY 6 HOURS PRN
Qty: 10 TABLET | Refills: 0 | Status: SHIPPED | OUTPATIENT
Start: 2023-10-26 | End: 2023-10-29

## 2023-10-26 RX ORDER — FENTANYL CITRATE 50 UG/ML
50 INJECTION, SOLUTION INTRAMUSCULAR; INTRAVENOUS EVERY 5 MIN PRN
Status: DISCONTINUED | OUTPATIENT
Start: 2023-10-26 | End: 2023-10-26 | Stop reason: HOSPADM

## 2023-10-26 RX ADMIN — SODIUM CHLORIDE, SODIUM LACTATE, POTASSIUM CHLORIDE, CALCIUM CHLORIDE: 600; 310; 30; 20 INJECTION, SOLUTION INTRAVENOUS at 14:10

## 2023-10-26 RX ADMIN — PROPOFOL 200 MCG/KG/MIN: 10 INJECTION, EMULSION INTRAVENOUS at 14:16

## 2023-10-26 RX ADMIN — LIDOCAINE HYDROCHLORIDE 80 MG: 20 INJECTION, SOLUTION INFILTRATION; PERINEURAL at 14:15

## 2023-10-26 RX ADMIN — CEFAZOLIN SODIUM 2 G: 2 SOLUTION INTRAVENOUS at 14:09

## 2023-10-26 RX ADMIN — KETOROLAC TROMETHAMINE 30 MG: 30 INJECTION, SOLUTION INTRAMUSCULAR; INTRAVENOUS at 15:29

## 2023-10-26 RX ADMIN — ACETAMINOPHEN 975 MG: 325 TABLET ORAL at 13:20

## 2023-10-26 RX ADMIN — DEXAMETHASONE SODIUM PHOSPHATE 4 MG: 4 INJECTION, SOLUTION INTRA-ARTICULAR; INTRALESIONAL; INTRAMUSCULAR; INTRAVENOUS; SOFT TISSUE at 14:16

## 2023-10-26 RX ADMIN — ONDANSETRON 4 MG: 2 INJECTION INTRAMUSCULAR; INTRAVENOUS at 14:20

## 2023-10-26 RX ADMIN — FENTANYL CITRATE 25 MCG: 50 INJECTION, SOLUTION INTRAMUSCULAR; INTRAVENOUS at 16:35

## 2023-10-26 RX ADMIN — FENTANYL CITRATE 25 MCG: 50 INJECTION, SOLUTION INTRAMUSCULAR; INTRAVENOUS at 16:40

## 2023-10-26 RX ADMIN — FENTANYL CITRATE 50 MCG: 50 INJECTION, SOLUTION INTRAMUSCULAR; INTRAVENOUS at 14:47

## 2023-10-26 RX ADMIN — FENTANYL CITRATE 50 MCG: 50 INJECTION, SOLUTION INTRAMUSCULAR; INTRAVENOUS at 15:18

## 2023-10-26 RX ADMIN — PROPOFOL 200 MG: 10 INJECTION, EMULSION INTRAVENOUS at 14:15

## 2023-10-26 RX ADMIN — FENTANYL CITRATE 25 MCG: 50 INJECTION, SOLUTION INTRAMUSCULAR; INTRAVENOUS at 14:36

## 2023-10-26 RX ADMIN — APREPITANT 40 MG: 40 CAPSULE ORAL at 13:50

## 2023-10-26 RX ADMIN — FENTANYL CITRATE 25 MCG: 50 INJECTION, SOLUTION INTRAMUSCULAR; INTRAVENOUS at 14:15

## 2023-10-26 RX ADMIN — FENTANYL CITRATE 25 MCG: 50 INJECTION, SOLUTION INTRAMUSCULAR; INTRAVENOUS at 15:59

## 2023-10-26 RX ADMIN — OXYCODONE HYDROCHLORIDE 5 MG: 5 TABLET ORAL at 16:27

## 2023-10-26 NOTE — ANESTHESIA PROCEDURE NOTES
Airway       Patient location during procedure: OR       Procedure Start/Stop Times: 10/26/2023 2:16 PM  Staff -        CRNA: Bettina Munoz       Performed By: CRNAIndications and Patient Condition       Indications for airway management: loretta-procedural       Induction type:intravenous       Mask difficulty assessment: 1 - vent by mask    Final Airway Details       Final airway type: supraglottic airway    Supraglottic Airway Details        Type: LMA       Brand: I-Gel       LMA size: 4    Post intubation assessment        Placement verified by: capnometry, equal breath sounds and chest rise        Number of attempts at approach: 1       Number of other approaches attempted: 0       Secured with: silk tape       Ease of procedure: easy       Dentition: Intact and Unchanged    Medication(s) Administered   Medication Administration Time: 10/26/2023 2:16 PM

## 2023-10-26 NOTE — ANESTHESIA POSTPROCEDURE EVALUATION
Patient: Jayne Clarke    Procedure: Procedure(s):  Right knee arthroscopic examination with two compartment synovectomy, chondroplasty, mass excision right knee  EXCISION, MASS, LOWER EXTREMITY       Anesthesia Type:  General    Note:  Disposition: Outpatient   Postop Pain Control: Uneventful            Sign Out: Well controlled pain   PONV: No   Neuro/Psych: Uneventful            Sign Out: Acceptable/Baseline neuro status   Airway/Respiratory: Uneventful            Sign Out: Acceptable/Baseline resp. status   CV/Hemodynamics: Uneventful            Sign Out: Acceptable CV status; No obvious hypovolemia; No obvious fluid overload   Other NRE:    DID A NON-ROUTINE EVENT OCCUR? No           Last vitals:  Vitals Value Taken Time   /85 10/26/23 1630   Temp 36.4  C (97.6  F) 10/26/23 1615   Pulse 94 10/26/23 1630   Resp 14 10/26/23 1630   SpO2 98 % 10/26/23 1630       Electronically Signed By: Lion Leslie MD  October 26, 2023  4:46 PM

## 2023-10-26 NOTE — ANESTHESIA CARE TRANSFER NOTE
Patient: Jayne Clarke    Procedure: Procedure(s):  Right knee arthroscopic examination with two compartment synovectomy, chondroplasty, mass excision right knee  EXCISION, MASS, LOWER EXTREMITY       Diagnosis: Mass of knee, right [R22.41]  Diagnosis Additional Information: No value filed.    Anesthesia Type:   General     Note:    Oropharynx: oropharynx clear of all foreign objects and spontaneously breathing  Level of Consciousness: awake  Oxygen Supplementation: face mask  Level of Supplemental Oxygen (L/min / FiO2): 6  Independent Airway: airway patency satisfactory and stable  Dentition: dentition unchanged  Vital Signs Stable: post-procedure vital signs reviewed and stable  Report to RN Given: handoff report given  Patient transferred to: PACU    Handoff Report: Identifed the Patient, Identified the Reponsible Provider, Reviewed the pertinent medical history, Discussed the surgical course, Reviewed Intra-OP anesthesia mangement and issues during anesthesia, Set expectations for post-procedure period and Allowed opportunity for questions and acknowledgement of understanding      Vitals:  Vitals Value Taken Time   /64 10/26/23 1548   Temp 97.3    Pulse 86 10/26/23 1550   Resp 25 10/26/23 1550   SpO2 99 % 10/26/23 1550   Vitals shown include unfiled device data.    Electronically Signed By: DINORAH QUINN  October 26, 2023  3:52 PM

## 2023-10-26 NOTE — ANESTHESIA PREPROCEDURE EVALUATION
Anesthesia Pre-Procedure Evaluation    Patient: Wanda Clarke   MRN: [id number] : 1995        Procedure : Procedure(s):  Right knee arthroscopic examination with possible mass excision right  EXCISION, MASS, LOWER EXTREMITY          Past Medical History:   Diagnosis Date    Fatty liver     Mass of right knee     PONV (postoperative nausea and vomiting)       Past Surgical History:   Procedure Laterality Date    ARTHROSCOPY KNEE      6-7 years ago      No Known Allergies   Social History     Tobacco Use    Smoking status: Never    Smokeless tobacco: Never   Substance Use Topics    Alcohol use: Never      Wt Readings from Last 1 Encounters:   10/20/23 73 kg (161 lb)        Anesthesia Evaluation   Pt has had prior anesthetic. Type: General and Regional (Epidural for delivery).    History of anesthetic complications  - PONV.      ROS/MED HX  ENT/Pulmonary:    (-) tobacco use and recent URI   Neurologic:    (-) no seizures   Cardiovascular:     (+)  - -   -  - -                                 No previous cardiac testing  (-) hypertension, taking anticoagulants/antiplatelets, LYN, arrhythmias and murmur   METS/Exercise Tolerance: >4 METS Comment: Activity more limited recently due to knee pain   Hematologic:     (+)       history of blood transfusion, no previous transfusion reaction,     (-) history of blood clots   Musculoskeletal: Comment: Right knee pain/possible mass      GI/Hepatic: Comment: Fatty liver on imaging    (+)             liver disease,    (-) GERD   Renal/Genitourinary:  - neg Renal ROS     Endo: Comment: A1C 5.3    (+)               Obesity,    (-) Type II DM   Psychiatric/Substance Use:  - neg psychiatric ROS     Infectious Disease:  - neg infectious disease ROS     Malignancy:  - neg malignancy ROS     Other:  - neg other ROS          Physical Exam    Airway        Mallampati: II   TM distance: > 3 FB   Neck ROM: full   Mouth opening: > 3 cm    Respiratory Devices and Support      "    Dental     Comment: Teeth examined without any significant abnormality, patient denies loose, missing or chipped teeth or anything removable.         Cardiovascular          Rhythm and rate: regular and normal (-) no murmur    Pulmonary   pulmonary exam normal        breath sounds clear to auscultation           OUTSIDE LABS:  CBC:   Lab Results   Component Value Date    WBC 9.9 10/20/2023    WBC 16.7 (H) 06/29/2023    HGB 14.2 10/20/2023    HGB 13.3 06/29/2023    HCT 40.5 10/20/2023    HCT 39.9 06/29/2023     10/20/2023     06/29/2023     BMP:   Lab Results   Component Value Date     10/20/2023     06/29/2023    POTASSIUM 3.6 10/20/2023    POTASSIUM 3.5 06/29/2023    CHLORIDE 104 10/20/2023    CHLORIDE 100 06/29/2023    CO2 24 10/20/2023    CO2 22 06/29/2023    BUN 14.0 10/20/2023    BUN 13.9 06/29/2023    CR 0.54 10/20/2023    CR 0.52 06/29/2023     (H) 10/20/2023     (H) 06/29/2023     COAGS:   Lab Results   Component Value Date    INR 1.10 10/20/2023     POC: No results found for: \"BGM\", \"HCG\", \"HCGS\"  HEPATIC:   Lab Results   Component Value Date    ALBUMIN 4.6 10/20/2023    PROTTOTAL 7.8 10/20/2023     (H) 10/20/2023     (H) 10/20/2023    ALKPHOS 118 (H) 10/20/2023    BILITOTAL 0.8 10/20/2023     OTHER:   Lab Results   Component Value Date    LACT 2.4 (H) 06/30/2023    JOSEFA 9.2 10/20/2023    LIPASE 17 06/29/2023    SED 25 (H) 06/19/2023       Anesthesia Plan    ASA Status:  2    NPO Status:  NPO Appropriate    Anesthesia Type: General.     - Airway: LMA   Induction: Intravenous, Propofol.   Maintenance: Balanced.        Consents    Anesthesia Plan(s) and associated risks, benefits, and realistic alternatives discussed. Questions answered and patient/representative(s) expressed understanding.     - Discussed: Risks, Benefits and Alternatives for BOTH SEDATION and the PROCEDURE were discussed     - Discussed with:  Patient      - Extended " Intubation/Ventilatory Support Discussed: No.      - Patient is DNR/DNI Status: No     Use of blood products discussed: No .     Postoperative Care    Pain management: IV analgesics, Oral pain medications, Multi-modal analgesia.   PONV prophylaxis: Ondansetron (or other 5HT-3), Dexamethasone or Solumedrol, Background Propofol Infusion, Aprepitant     Comments:    Other Comments: Discussed plan for general anesthetic with LMA. Discussed risks of sore throat, post op pain/nausea, oropharyngeal damage, rare major complications.         H&P reviewed: Unable to attach H&P to encounter due to EHR limitations. H&P Update: appropriate H&P reviewed, patient examined. No interval changes since H&P (within 30 days).         Lion Leslie MD

## 2023-10-26 NOTE — DISCHARGE INSTRUCTIONS
"[facility]  Home Care Following Anesthesia  For 24 hours after surgery:  Get plenty of rest.  A responsible adult must stay with you for at least 24 hours after you leave the surgery center.  Do not drive or use heavy equipment.  If you have weakness or tingling, don't drive or use heavy equipment until this feeling goes away.   Do not drink alcohol.   Avoid strenuous or risky activities.  Ask for help when climbing stairs.  You may feel lightheaded.  IF so, sit for a few minutes before standing.  Have someone help you get up.   If you have nausea (feel sick to your stomach): Drink only clear liquids such as apple juice, ginger ale, broth or 7-Up.  Rest may also help.  Be sure to drink enough fluids.  Move to a regular diet as you feel able.   You may have a slight fever.  Call the doctor if your fever is over 100 F (37.7 C) (taken under the tongue) or lasts longer than 24 hours.  You may have a dry mouth, a sore throat, muscle aches or trouble sleeping. These should go away after 24 hours.  Do not make important or legal decisions.   It is recommended to avoid smoking.        Today you received a Marcaine or bupivacaine block to numb the nerves near your surgery site.  This is a block using local anesthetic or \"numbing\" medication injected around the nerves to anesthetize or \"numb\" the area supplied by those nerves.  This block is injected into the muscle layer near your surgical site.  The medication may numb the location where you had surgery for 6-18 hours, but may last up to 24 hours.  If your surgical site is an arm or leg you should be careful with your affected limb, since it is possible to injure your limb without being aware of it due to the numbing.  Until full feeling returns, you should guard against bumping or hitting your limb, and avoid extreme hot or cold temperatures on the skin.  As the block wears off, the feeling will return as a tingling or prickly " sensation near your surgical site.  You will experience more discomfort from your incision as the feeling returns.  You may want to take a pain pill (a narcotic or Tylenol if this was prescribed by your surgeon) when you start to experience mild pain before the pain beccomes more severe.  If your pain medications do not control your pain you should notifiy your surgeon.    Tips for taking pain medications  To get the best pain relief possible, remember these points:  Take pain medications as directed, before pain becomes severe.  Pain medication can upset your stomach: taking it with food may help.  Constipation is a common side effect of pain medication. Drink plenty of  fluids.  Eat foods high in fiber. Take a stool softener if recommended by your doctor or pharmacist.  Do not drink alcohol, drive or operate machinery while taking pain medications.  Ask about other ways to control pain, such as with heat, ice or relaxation.    Tylenol/Acetaminophen Consumption    If you feel your pain relief is insufficient, you may take Tylenol/Acetaminophen in addition to your narcotic pain medication.   Be careful not to exceed 4,000 mg of Tylenol/Acetaminophen in a 24 hour period from all sources.  If you are taking extra strength Tylenol/acetaminophen (500 mg), the maximum dose is 8 tablets in 24 hours.  If you are taking regular strength acetaminophen (325 mg), the maximum dose is 12 tablets in 24 hours.  975 mg of Tylenol given at 1:20 pm next dose may be given after 7:20 pm    Call a doctor for any of the following:  Signs of infection (fever, growing tenderness at the surgery site, a large amount of drainage or bleeding, severe pain, foul-smelling drainage, redness, swelling).  It has been over 8 to 10 hours since surgery and you are still not able to urinate (pass water).  Headache for over 24 hours.  Signs of Covid-19 infection (temperature over 100 degrees, shortness of breath, cough, loss of taste/smell, generalized  body aches, persistent headache, chills, sore throat, nausea/vomiting/diarrhea)  Your doctor is:       Dr. Artis Gomez, Orthopaedics: [phone]               Or dial [phone] and ask for the resident on call for:  Orthopaedics  For emergency care, call the:  [facility]: [phone] (TTY for hearing impaired: [phone])

## 2023-10-30 NOTE — OP NOTE
DATE OF SURGERY: 10/26/2023    PREOPERATIVE DIAGNOSIS: Right knee mass     POSTOPERATIVE DIAGNOSIS: Right knee mass    PROCEDURE: #1 right knee arthroscopic synovectomy, 2 compartment, #2 right knee chondroplasty, #3 excision right knee mass, deep, 2 cm    SURGEON: Artis Gomez MD     ASSISTANT: Odalis Guzmán MD    PATIENT HISTORY: This patient has a history of some right knee pain and swelling.  There is concern for synovial based process such as a synovial chondromatosis but she has not had any obvious knee trauma.  She understands risk of stiffness infection pain bleeding numbness tingling.    DESCRIPTION OF PROCEDURE: The patient underwent successful induction of anesthesia.  The right leg was washed and sterilely prepped and draped.  Patient previously had arthroscopic portals placed in the knee as we used these scars first making incision laterally.  We placed a trocar and then the trocar and cannula through the capsule into the knee joint.  I explored the knee and the patient has some synovitis but nothing that look like a tenosynovial giant cell tumor or synovial chondromatosis.  She also had a plica in the superior medial aspect of the knee.  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  I visualized the medial knee and put a needle through the patient's previous scar there.  I was happy with that position for portal and so we incised the skin and then placed the shaver and through that side.  I used a shaver to remove hypertrophic synovium from the suprapatellar pouch and the medial lateral gutters and also the anterior knee and also the notch overlying the ACL.  Next I used the shaver to trim the undermined flap portions of the articular cartilage on the medial femoral condyle.  Once this cartilage was stabilized we irrigated the knee to remove debris.  I was really not able to see around the corner of the  tibial plateau underneath the posterior lateral corner where the soft tissue mass was.  We then evacuated the fluid from the knee and turned our attention to the lateral knee.  I made an incision just anterior to the lateral collateral ligament sharply.  The incisions extended from the joint line distally.  After incising skin sharply divided subcutaneous tissue with cautery.  We then again identified the ligament and incised the fascial tissue just anterior to the ligament.  I then dissected bluntly down to the bone and was able to see the anterior edge of this bony fragment.  It was removed.  This was a disc of bone looking material the may have had some cartilage on the surface of it.  There is just one mass there consistent with her imaging.  We irrigated and closed.  Some 0 Vicryl was used in the deep fascial layer followed by 2-0 Vicryl and subcutaneous tissue Monocryl and the skin Steri-Strips and a sterile dry dressing.  All 3 incisions were closed.  The patient was extubated and taken to the recovery room in stable condition.  The estimated blood loss is 20 mL.  There were no complications.  I was present for all critical portions of the procedure the specimen was sent in formalin to pathology.    Artis Gomez MD

## 2023-10-31 LAB
PATH REPORT.COMMENTS IMP SPEC: NORMAL
PATH REPORT.COMMENTS IMP SPEC: NORMAL
PATH REPORT.FINAL DX SPEC: NORMAL
PATH REPORT.GROSS SPEC: NORMAL
PATH REPORT.MICROSCOPIC SPEC OTHER STN: NORMAL
PATH REPORT.RELEVANT HX SPEC: NORMAL
PHOTO IMAGE: NORMAL

## 2023-11-10 ENCOUNTER — OFFICE VISIT (OUTPATIENT)
Dept: ORTHOPEDICS | Facility: CLINIC | Age: 28
End: 2023-11-10
Payer: COMMERCIAL

## 2023-11-10 DIAGNOSIS — R22.41 MASS OF KNEE, RIGHT: Primary | ICD-10-CM

## 2023-11-10 PROCEDURE — 99024 POSTOP FOLLOW-UP VISIT: CPT | Performed by: ORTHOPAEDIC SURGERY

## 2023-11-10 NOTE — PROGRESS NOTES
This is a 28-year-old status post right knee arthroscopy.  She had a loose body that we removed through a separate lateral incision as well.  She has been using crutches and has been slow to mobilize.  She has noticed that the swelling has come down.    On examination her incisions are clean dry and intact without any erythema.  She has a small effusion in the knee.  She is able extend the knee fully.    I encouraged her to work with physical therapy and get back to regular activities.  I do not see any reason she could not return to being very active.  She did have some chondromalacia but is difficult to predict if this will progress.  I answered all her questions today.  They will return to see me as needed

## 2023-11-10 NOTE — LETTER
11/10/2023         RE: Jayne Clarke  [id number]  [facility] [id number]        Dear Colleague,    Thank you for referring your patient, Jayne Clarke, to the [facility] [place]. Please see a copy of my visit note below.    This is a 28-year-old status post right knee arthroscopy.  She had a loose body that we removed through a separate lateral incision as well.  She has been using crutches and has been slow to mobilize.  She has noticed that the swelling has come down.    On examination her incisions are clean dry and intact without any erythema.  She has a small effusion in the knee.  She is able extend the knee fully.    I encouraged her to work with physical therapy and get back to regular activities.  I do not see any reason she could not return to being very active.  She did have some chondromalacia but is difficult to predict if this will progress.  I answered all her questions today.  They will return to see me as needed      Sincerely,        Artis Gomez MD

## 2023-11-10 NOTE — NURSING NOTE
Reason For Visit:   Chief Complaint   Patient presents with    Surgical Followup     2 wk post-op right knee surgery DOS 10/26/23        There were no vitals taken for this visit.    Pain Assessment  Patient Currently in Pain: Yes  0-10 Pain Scale: 5  Primary Pain Location: Knee (right)      Ortega Jansen ATC

## 2023-11-16 ENCOUNTER — THERAPY VISIT (OUTPATIENT)
Dept: PHYSICAL THERAPY | Facility: CLINIC | Age: 28
End: 2023-11-16
Attending: ORTHOPAEDIC SURGERY
Payer: COMMERCIAL

## 2023-11-16 DIAGNOSIS — R22.41 MASS OF KNEE, RIGHT: ICD-10-CM

## 2023-11-16 DIAGNOSIS — M25.561 RIGHT KNEE PAIN: Primary | ICD-10-CM

## 2023-11-16 PROCEDURE — 97110 THERAPEUTIC EXERCISES: CPT | Mod: GP

## 2023-11-16 PROCEDURE — 97161 PT EVAL LOW COMPLEX 20 MIN: CPT | Mod: GP

## 2023-11-16 PROCEDURE — 97530 THERAPEUTIC ACTIVITIES: CPT | Mod: GP

## 2023-11-16 ASSESSMENT — ACTIVITIES OF DAILY LIVING (ADL)
WEAKNESS: THE SYMPTOM PREVENTS ME FROM ALL DAILY ACTIVITIES
LIMPING: THE SYMPTOM PREVENTS ME FROM ALL DAILY ACTIVITIES
SQUAT: I AM UNABLE TO DO THE ACTIVITY
HOW_WOULD_YOU_RATE_THE_CURRENT_FUNCTION_OF_YOUR_KNEE_DURING_YOUR_USUAL_DAILY_ACTIVITIES_ON_A_SCALE_FROM_0_TO_100_WITH_100_BEING_YOUR_LEVEL_OF_KNEE_FUNCTION_PRIOR_TO_YOUR_INJURY_AND_0_BEING_THE_INABILITY_TO_PERFORM_ANY_OF_YOUR_USUAL_DAILY_ACTIVITIES?: 100
KNEEL ON THE FRONT OF YOUR KNEE: I AM UNABLE TO DO THE ACTIVITY
KNEE_ACTIVITY_OF_DAILY_LIVING_SUM: 13
KNEE_ACTIVITY_OF_DAILY_LIVING_SCORE: 18.57
RAW_SCORE: 13
HOW_WOULD_YOU_RATE_THE_OVERALL_FUNCTION_OF_YOUR_KNEE_DURING_YOUR_USUAL_DAILY_ACTIVITIES?: ABNORMAL
AS_A_RESULT_OF_YOUR_KNEE_INJURY,_HOW_WOULD_YOU_RATE_YOUR_CURRENT_LEVEL_OF_DAILY_ACTIVITY?: ABNORMAL
SWELLING: I HAVE THE SYMPTOM BUT IT DOES NOT AFFECT MY ACTIVITY
RISE FROM A CHAIR: ACTIVITY IS SOMEWHAT DIFFICULT
GO DOWN STAIRS: ACTIVITY IS SOMEWHAT DIFFICULT
SIT WITH YOUR KNEE BENT: ACTIVITY IS SOMEWHAT DIFFICULT
WALK: I AM UNABLE TO DO THE ACTIVITY
GIVING WAY, BUCKLING OR SHIFTING OF KNEE: THE SYMPTOM PREVENTS ME FROM ALL DAILY ACTIVITIES
STAND: I AM UNABLE TO DO THE ACTIVITY
PAIN: THE SYMPTOM PREVENTS ME FROM ALL DAILY ACTIVITIES
GO UP STAIRS: I AM UNABLE TO DO THE ACTIVITY
STIFFNESS: THE SYMPTOM PREVENTS ME FROM ALL DAILY ACTIVITIES

## 2023-11-16 NOTE — PROGRESS NOTES
PHYSICAL THERAPY EVALUATION  Type of Visit: Evaluation    See electronic medical record for Abuse and Falls Screening details.    Subjective       Presenting condition or subjective complaint:    Patient reports to outpatient physical therapy with R knee pain s/p excision of mass R knee on 10/26/2023. Thee pain built over time - there was no trauma that was associated with needing the surgery. At the beginning they thought it was a mass, but then they found out it was bone. She still has pain and it is hard to move her knee - especially with walking. She is using crutches. She has pain with bending her knee and putting weight on it. She says that the pain is a little better. She has not been going up and down stairs right now so difficult to know. She is noticing a little bit of swelling above her knee right now. She is able to sleep through the night.     Goals: walking, running, and getting back to exercise    Date of onset: 10/26/23 (date of surgery)    Relevant medical history:     Past Medical History:   Diagnosis Date    Fatty liver     Mass of right knee     PONV (postoperative nausea and vomiting)      Dates & types of surgery:    Past Surgical History:   Procedure Laterality Date    ARTHROSCOPY KNEE      6-7 years ago    ARTHROSCOPY KNEE Right 10/26/2023    Procedure: Right knee arthroscopic examination with two compartment synovectomy, chondroplasty, mass excision right knee;  Surgeon: Artis Gomez MD;  Location: [facility] OR    EXCISE MASS LOWER EXTREMITY Right 10/26/2023    Procedure: EXCISION, MASS, LOWER EXTREMITY;  Surgeon: Artis Gomez MD;  Location: [facility] OR       Prior diagnostic imaging/testing results: MRI       Prior therapy history for the same diagnosis, illness or injury: No      Prior Level of Function  Transfers: Assistive equipment  Ambulation: Assistive equipment  ADL: Assistive equipment  IADL: Driving, Finances, Housekeeping, Laundry, Meal preparation, Medication  management, Work, Yard work    Living Environment  Social support: With a significant other or spouse   Type of home: [facility]   Stairs to enter the home: Yes       Ramp: No   Stairs inside the home: No       Help at home: Self Cares (home health aide/personal care attendant, family, etc); Home and Yard maintenance tasks; Home management tasks (cooking, cleaning); Medication and/or finances; Assist for driving and community activities  Equipment owned: Crutches     Employment: No    Hobbies/Interests: walk    Patient goals for therapy: walk, run    Pain assessment: Pain present     Objective   KNEE EVALUATION  PAIN: Pain Level at Rest: 5/10  Pain Level with Use: 5/10  INTEGUMENTARY (edema, incisions): Sweep Test: 3+; 38 cm suprapatellar, 36 joint line, 33 infrapatellar circumference cm R side; 38 deg suprapatellar L side, 35 deg joint line, 33 deg infrapatellar  POSTURE: Standing Posture: slightly forward flexed on crutches  GAIT:  Weightbearing Status: WBAT  Assistive Device(s): Crutches (bilateral)  Gait Deviations: Antalgic  Stride length decreased  Tawana decreased  Using bilateral crutches for ambulation WBAT, significant decreased tolerance to WB on R side - trial without crutches and single hand support on countertop but leaned heavily on UE  BALANCE/PROPRIOCEPTION:  not assessed  WEIGHTBEARING ALIGNMENT:  weight shift onto L side  ROM:  L: 3-0-125 deg, R side: 3-73 deg  STRENGTH:  quad set: fair - poor activation; unable to do SLR on R side  FLEXIBILITY:  not assessed  SPECIAL TESTS:  not assessed - surgery  FUNCTIONAL TESTS:  not assessed  PALPATION:  not assessed    Assessment & Plan   CLINICAL IMPRESSIONS  Medical Diagnosis: R knee pain    Treatment Diagnosis:     Impression/Assessment: Patient is a 28 year old female with R knee pain complaints.  The following significant findings have been identified: Pain, Decreased ROM/flexibility, Decreased joint mobility, Decreased strength, Impaired balance,  Edema, Impaired gait, Impaired muscle performance, Decreased activity tolerance, and Impaired posture. These impairments interfere with their ability to perform self care tasks, recreational activities, household chores, driving , household mobility, and community mobility as compared to previous level of function.     Clinical Decision Making (Complexity):  Clinical Presentation: Stable/Uncomplicated  Clinical Presentation Rationale: based on medical and personal factors listed in PT evaluation  Clinical Decision Making (Complexity): Low complexity    PLAN OF CARE  Treatment Interventions:  Modalities: Cryotherapy, E-stim, Vasoneumatic Device  Interventions: Gait Training, Manual Therapy, Neuromuscular Re-education, Therapeutic Activity, Therapeutic Exercise, Self-Care/Home Management    Long Term Goals     PT Goal 1  Goal Identifier: ambulation  Goal Description: patient will be able to walk without assistive device and 1/10 pain or less for 10 min  Rationale: to maximize safety and independence with performance of ADLs and functional tasks;to maximize safety and independence within the home;to maximize safety and independence with self cares  Goal Progress: patient currently using bilateral axillary crutches for ambulation, offloading R side  Target Date: 02/08/24  PT Goal 2  Goal Identifier: knee ROM  Goal Description: patient will be able to restore symmetrical ROM on R side to L side  Rationale: to maximize safety and independence within the home;to maximize safety and independence with performance of ADLs and functional tasks;to maximize safety and independence with self cares  Goal Progress: patient currently lacking extension and flexion ROM - see note  Target Date: 02/08/24      Frequency of Treatment: 1x/wk progressing to 1 x every other week  Duration of Treatment: 2-3 months    Recommended Referrals to Other Professionals: n/a  Education Assessment:   Learner/Method: Patient;Family    Risks and benefits  of evaluation/treatment have been explained.   Patient/Family/caregiver agrees with Plan of Care.     Evaluation Time:     PT Marcella, Low Complexity Minutes (81159): 10   Present: Yes: Language: [place] citizen, ID Number/Identifier: unknown     Signing Clinician: Radha Galeana, PT      [facility]                                                                                   OUTPATIENT PHYSICAL THERAPY      PLAN OF TREATMENT FOR OUTPATIENT REHABILITATION   Patient's Last Name, First Name, M.VIRGIL Cooper Vince,Jayne DEVLIN YOB: 1995   Provider's Name   [facility]   Medical Record No.  [id number]     Onset Date: 10/26/23 (date of surgery)  Start of Care Date: 11/16/23     Medical Diagnosis:  R knee pain      PT Treatment Diagnosis:    Plan of Treatment  Frequency/Duration: 1x/wk progressing to 1 x every other week/ 2-3 months    Certification date from 11/16/23 to 02/08/24         See note for plan of treatment details and functional goals     Radha Galeana, PT                         I CERTIFY THE NEED FOR THESE SERVICES FURNISHED UNDER        THIS PLAN OF TREATMENT AND WHILE UNDER MY CARE     (Physician attestation of this document indicates review and certification of the therapy plan).              Referring Provider:  Artis Gomez    Initial Assessment  See Epic Evaluation- Start of Care Date: 11/16/23

## 2023-11-21 ENCOUNTER — THERAPY VISIT (OUTPATIENT)
Dept: PHYSICAL THERAPY | Facility: CLINIC | Age: 28
End: 2023-11-21
Payer: COMMERCIAL

## 2023-11-21 DIAGNOSIS — M25.561 RIGHT KNEE PAIN: Primary | ICD-10-CM

## 2023-11-21 PROCEDURE — 97116 GAIT TRAINING THERAPY: CPT | Mod: GP | Performed by: PHYSICAL THERAPIST

## 2023-11-21 PROCEDURE — 97110 THERAPEUTIC EXERCISES: CPT | Mod: GP | Performed by: PHYSICAL THERAPIST

## 2023-12-01 ENCOUNTER — THERAPY VISIT (OUTPATIENT)
Dept: PHYSICAL THERAPY | Facility: CLINIC | Age: 28
End: 2023-12-01
Payer: COMMERCIAL

## 2023-12-01 DIAGNOSIS — M25.561 RIGHT KNEE PAIN: Primary | ICD-10-CM

## 2023-12-01 DIAGNOSIS — R22.41 MASS OF KNEE, RIGHT: ICD-10-CM

## 2023-12-01 PROCEDURE — 97110 THERAPEUTIC EXERCISES: CPT | Mod: GP | Performed by: PHYSICAL THERAPIST

## 2023-12-07 ENCOUNTER — THERAPY VISIT (OUTPATIENT)
Dept: PHYSICAL THERAPY | Facility: CLINIC | Age: 28
End: 2023-12-07
Payer: COMMERCIAL

## 2023-12-07 DIAGNOSIS — M25.561 RIGHT KNEE PAIN: Primary | ICD-10-CM

## 2023-12-07 PROCEDURE — 97116 GAIT TRAINING THERAPY: CPT | Mod: GP

## 2023-12-07 PROCEDURE — 97110 THERAPEUTIC EXERCISES: CPT | Mod: GP

## 2023-12-07 PROCEDURE — 97140 MANUAL THERAPY 1/> REGIONS: CPT | Mod: GP

## 2023-12-14 ENCOUNTER — THERAPY VISIT (OUTPATIENT)
Dept: PHYSICAL THERAPY | Facility: CLINIC | Age: 28
End: 2023-12-14
Payer: COMMERCIAL

## 2023-12-14 DIAGNOSIS — M25.561 RIGHT KNEE PAIN: Primary | ICD-10-CM

## 2023-12-14 PROCEDURE — 97110 THERAPEUTIC EXERCISES: CPT | Mod: GP

## 2023-12-21 ENCOUNTER — THERAPY VISIT (OUTPATIENT)
Dept: PHYSICAL THERAPY | Facility: CLINIC | Age: 28
End: 2023-12-21
Payer: COMMERCIAL

## 2023-12-21 DIAGNOSIS — M25.561 RIGHT KNEE PAIN: Primary | ICD-10-CM

## 2023-12-21 PROCEDURE — 97110 THERAPEUTIC EXERCISES: CPT | Mod: GP

## 2023-12-21 PROCEDURE — 97112 NEUROMUSCULAR REEDUCATION: CPT | Mod: GP

## 2023-12-21 NOTE — PROGRESS NOTES
12/21/23 0500   Appointment Info   Signing clinician's name / credentials Radha Galeana, PT, DPT   Total/Authorized Visits 10   Visits Used 6   Medical Diagnosis R knee pain   Precautions/Limitations none, WBAT   Other pertinent information s/p excision of mass R knee on 10/26/2023   Quick Adds Certification   Progress Note/Certification   Start of Care Date 11/16/23   Onset of illness/injury or Date of Surgery 10/26/23  (date of surgery)   Therapy Frequency 1x/wk progressing to 1 x every other week   Predicted Duration 2-3 months   Certification date from 11/16/23   Certification date to 02/08/24   Progress Note Completed Date 12/21/23       Present Yes    Language Croatian    ID phone   Preferred Language Croatian   GOALS   PT Goals 2   PT Goal 1   Goal Identifier ambulation   Goal Description patient will be able to walk without assistive device and 1/10 pain or less for 10 min   Rationale to maximize safety and independence with performance of ADLs and functional tasks;to maximize safety and independence within the home;to maximize safety and independence with self cares   Goal Progress no assistive device   Target Date 02/08/24   PT Goal 2   Goal Identifier knee ROM   Goal Description patient will be able to restore symmetrical ROM on R side to L side   Rationale to maximize safety and independence within the home;to maximize safety and independence with performance of ADLs and functional tasks;to maximize safety and independence with self cares   Goal Progress improving see objective measure   Target Date 02/08/24   Subjective Report   Subjective Report Patient reports that she is doing good. She has only a little bit of pain after doing the exercises. Main goals are to get back to walking and stairs without pain.   Objective Measures   Objective Measures Objective Measure 1;Objective Measure 2   Objective Measure 1   Objective Measure R knee AROM   Details R knee:  0-110 deg beginning of session, post manual 119 deg knee flexion R side   Objective Measure 2   Objective Measure squat   Details slight weight shift L, improved with TC   Treatment Interventions (PT)   Interventions Therapeutic Procedure/Exercise;Manual Therapy;Neuromuscular Re-education   Therapeutic Procedure/Exercise   Therapeutic Procedures: strength, endurance, ROM, flexibillity minutes (77136) 23   Ther Proc 2 upright bike   Ther Proc 2 - Details x 5 in clinic at beginning of session for warm-up   PTRx Ther Proc 1 Seated Heel Slide with Foot on the Floor   PTRx Ther Proc 1 - Details hep for mobility   PTRx Ther Proc 2 Stair Knee Flexion Stretch   PTRx Ther Proc 2 - Details hep for mobility   PTRx Ther Proc 3 Heel Slides   PTRx Ther Proc 3 - Details x 10 after manual therapy in clinic for mobility AROM   PTRx Ther Proc 4 Functional Knee Extension with Tubing   PTRx Ther Proc 4 - Details x 10 on R side in clinic with green band and VC to point toes up to the ceiling   PTRx Ther Proc 5 Short Arc Knee Extension (Long Sitting)   PTRx Ther Proc 5 - Details hep for strengthening   PTRx Ther Proc 6 Short Arc Knee Extension   PTRx Ther Proc 6 - Details hep for strengthening   PTRx Ther Proc 7 Hip Flexion Straight Leg Raise   PTRx Ther Proc 7 - Details hep for strengthening    PTRx Ther Proc 8 Hip Abduction Straight Leg Raise   PTRx Ther Proc 8 - Details hep for strengthening   PTRx Ther Proc 9 Bridging   PTRx Ther Proc 9 - Details x 10 in clinic with TC for hips level and VC to squeeze through glutes   PTRx Ther Proc 10 Side Stepping With Theraband   PTRx Ther Proc 10 - Details 2 x 10 each direction with demonstration for set-up and red band on upper thighs   PTRx Ther Proc 11 Stair Step Ups   PTRx Ther Proc 11 - Details hep for strengthening   Skilled Intervention see above   Patient Response/Progress patient tolerated well   Therapeutic Activity   PTRx Ther Act 1 Icing   PTRx Ther Act 1 - Details hep ice and  compression/elevation for swelling reduction and pain relief - continue wearing compression during day   PTRx Ther Act 2 Icing   PTRx Ther Act 2 - Details VR in clinic patient education in clinic to continue ice and compression/elevation for swelling reduction and pain relief - continue wearing compression during day   Neuromuscular Re-education   Neuromuscular re-ed of mvmt, balance, coord, kinesthetic sense, posture, proprioception minutes (44181) 10   PTRx Neuro Re-ed 1 Proprioception At Counter Weight Shift   PTRx Neuro Re-ed 1 - Details VC for weight shift in each foot for squat   PTRx Neuro Re-ed 2 Squat   PTRx Neuro Re-ed 2 - Details 2 x 10 in clinic with TC external cue and VC for equal weight through each foot with the lowering phase- use of mirror in clinic   Skilled Intervention see above   Patient Response/Progress patient tolerated well   PTRx Neuro Re-ed 3 Single leg balance on a Pillow - Right Foot   PTRx Neuro Re-ed 3 - Details 3 x 20 sec on R side in clinic with demonstration for set-up 2 x 20 sec on L side in clinic with head turn 2x 20 sec on R side with head turns in clinic   Manual Therapy   Manual Therapy: Mobilization, MFR, MLD, friction massage minutes (54558) 5   Manual Therapy Manual Therapy 2   Manual Therapy 1 mobilization   Manual Therapy 1 - Details posterior glide on tibia grade 3-4 with end range oscillations   Skilled Intervention to improve R knee ROM   Patient Response/Progress patient tolerated well and demonstrates improved R knee ROM post manual therapy   Intervention (Other)   PTRx Other  1 Standing Hip Flexion   PTRx Other 1 - Details hep as for gait quality   PTRx Other  2 Step Overs: Forward   PTRx Other 2 - Details hep as for gait quality   Education   Learner/Method Patient   Plan   Home program see PTRX   Updates to plan of care n/a   Plan for next session knee ROM, manual for knee flexion, quad strengthening, gait retraining exercises as needed, DL standing quad strength  vs leg press, progress to SL strength as able   Total Session Time   Timed Code Treatment Minutes 38   Total Treatment Time (sum of timed and untimed services) 38       PLAN  Continue therapy per current plan of care.    Beginning/End Dates of Progress Note Reporting Period:  11/16/2023 to 12/21/2023    Referring Provider:  Artis Gomez

## 2023-12-28 ENCOUNTER — THERAPY VISIT (OUTPATIENT)
Dept: PHYSICAL THERAPY | Facility: CLINIC | Age: 28
End: 2023-12-28
Payer: COMMERCIAL

## 2023-12-28 DIAGNOSIS — M25.561 RIGHT KNEE PAIN: Primary | ICD-10-CM

## 2023-12-28 PROCEDURE — 97112 NEUROMUSCULAR REEDUCATION: CPT | Mod: GP

## 2023-12-28 PROCEDURE — 97110 THERAPEUTIC EXERCISES: CPT | Mod: GP

## 2024-01-10 ENCOUNTER — THERAPY VISIT (OUTPATIENT)
Dept: PHYSICAL THERAPY | Facility: CLINIC | Age: 29
End: 2024-01-10
Payer: COMMERCIAL

## 2024-01-10 DIAGNOSIS — M25.561 RIGHT KNEE PAIN: Primary | ICD-10-CM

## 2024-01-10 PROCEDURE — 97110 THERAPEUTIC EXERCISES: CPT | Mod: GP

## 2024-01-24 ENCOUNTER — THERAPY VISIT (OUTPATIENT)
Dept: PHYSICAL THERAPY | Facility: CLINIC | Age: 29
End: 2024-01-24
Payer: COMMERCIAL

## 2024-01-24 DIAGNOSIS — M25.561 RIGHT KNEE PAIN: Primary | ICD-10-CM

## 2024-01-24 PROCEDURE — 97110 THERAPEUTIC EXERCISES: CPT | Mod: GP

## 2024-01-24 PROCEDURE — 97530 THERAPEUTIC ACTIVITIES: CPT | Mod: GP

## 2024-01-24 ASSESSMENT — ACTIVITIES OF DAILY LIVING (ADL)
GIVING WAY, BUCKLING OR SHIFTING OF KNEE: I DO NOT HAVE THE SYMPTOM
SQUAT: ACTIVITY IS MINIMALLY DIFFICULT
GO DOWN STAIRS: ACTIVITY IS MINIMALLY DIFFICULT
KNEE_ACTIVITY_OF_DAILY_LIVING_SUM: 60
STIFFNESS: I HAVE THE SYMPTOM BUT IT DOES NOT AFFECT MY ACTIVITY
KNEE_ACTIVITY_OF_DAILY_LIVING_SCORE: 85.71
RAW_SCORE: 60
GO UP STAIRS: ACTIVITY IS MINIMALLY DIFFICULT
LIMPING: I HAVE THE SYMPTOM BUT IT DOES NOT AFFECT MY ACTIVITY
PAIN: I DO NOT HAVE THE SYMPTOM
WEAKNESS: I HAVE THE SYMPTOM BUT IT DOES NOT AFFECT MY ACTIVITY
AS_A_RESULT_OF_YOUR_KNEE_INJURY,_HOW_WOULD_YOU_RATE_YOUR_CURRENT_LEVEL_OF_DAILY_ACTIVITY?: NEARLY NORMAL
KNEEL ON THE FRONT OF YOUR KNEE: ACTIVITY IS SOMEWHAT DIFFICULT
RISE FROM A CHAIR: ACTIVITY IS NOT DIFFICULT
WALK: ACTIVITY IS MINIMALLY DIFFICULT
SWELLING: I DO NOT HAVE THE SYMPTOM
HOW_WOULD_YOU_RATE_THE_OVERALL_FUNCTION_OF_YOUR_KNEE_DURING_YOUR_USUAL_DAILY_ACTIVITIES?: NEARLY NORMAL
SIT WITH YOUR KNEE BENT: ACTIVITY IS NOT DIFFICULT
STAND: ACTIVITY IS MINIMALLY DIFFICULT
HOW_WOULD_YOU_RATE_THE_CURRENT_FUNCTION_OF_YOUR_KNEE_DURING_YOUR_USUAL_DAILY_ACTIVITIES_ON_A_SCALE_FROM_0_TO_100_WITH_100_BEING_YOUR_LEVEL_OF_KNEE_FUNCTION_PRIOR_TO_YOUR_INJURY_AND_0_BEING_THE_INABILITY_TO_PERFORM_ANY_OF_YOUR_USUAL_DAILY_ACTIVITIES?: 80

## 2024-01-24 NOTE — PROGRESS NOTES
01/24/24 0500   Appointment Info   Signing clinician's name / credentials Radha Galeana, PT, DPT   Total/Authorized Visits 10   Visits Used 9   Medical Diagnosis R knee pain   Precautions/Limitations none, WBAT   Other pertinent information s/p excision of mass R knee on 10/26/2023   Quick Adds Certification   Progress Note/Certification   Start of Care Date 11/16/23   Onset of illness/injury or Date of Surgery 10/26/23  (date of surgery)   Therapy Frequency 1 x every other week progressing to 1 x every 3 weeks   Predicted Duration 2-3 months   Certification date from 1/24/2024   Certification date to 4/17/2024   Progress Note Completed Date 01/24/24       Present Yes    Language [place]er    ID or First/Last Name phone   Preferred Language [place]er   GOALS   PT Goals 2   PT Goal 1   Goal Identifier ambulation   Goal Description patient will be able to walk without assistive device and 1/10 pain or less for 10 min   Rationale to maximize safety and independence with performance of ADLs and functional tasks;to maximize safety and independence within the home;to maximize safety and independence with self cares   Goal Progress improving - after 20 min feels that her knee will start to bend   Target Date 02/08/24   PT Goal 2   Goal Identifier knee ROM   Goal Description patient will be able to restore symmetrical ROM on R side to L side   Rationale to maximize safety and independence within the home;to maximize safety and independence with performance of ADLs and functional tasks;to maximize safety and independence with self cares   Goal Progress goal met; 128 deg flexion R side   Target Date 02/08/24   Date Met 01/24/24   Subjective Report   Subjective Report Patient reports that she continues to feel better. She recently took off the brace for when she is walking and this feels difficult to walk - does not feel straight enough- like there is a little bit of a bend in it  usually after a while of walking. She is not limited by pain. Exercises are going well, feel like a good challenge.   Objective Measures   Objective Measures Objective Measure 1;Objective Measure 2;Objective Measure 3   Objective Measure 1   Objective Measure R knee AROM   Details R knee: 0-128 deg no pain   Objective Measure 2   Objective Measure gait   Details normal   Objective Measure 3   Objective Measure squat DL   Details slight weight shift L with lowering   Treatment Interventions (PT)   Interventions Therapeutic Procedure/Exercise;Therapeutic Activity   Therapeutic Procedure/Exercise   Therapeutic Procedures: strength, endurance, ROM, flexibillity minutes (57471) 30   Ther Proc 1 leg press   Ther Proc 1 - Details 2 x 8 x 80# DL up and down with VC for motion, x 10 DL up and SL down at 60#   Ther Proc 2 upright bike   Ther Proc 2 - Details x 5 min clinic at beginning of session for warm-up   PTRx Ther Proc 1 Heel Slides   PTRx Ther Proc 1 - Details hep for mobility   PTRx Ther Proc 2 Functional Knee Extension with Tubing   PTRx Ther Proc 2 - Details hep for strengthening   PTRx Ther Proc 3 Short Arc Knee Extension   PTRx Ther Proc 3 - Details hep for strengthening VR in clinic   PTRx Ther Proc 4 Hip Abduction Straight Leg Raise   PTRx Ther Proc 4 - Details hep for strengthening   PTRx Ther Proc 5 Bridging   PTRx Ther Proc 5 - Details x 10 each side in clinic with TC and VC for LE position   PTRx Ther Proc 6 Squat   PTRx Ther Proc 6 - Details for objective measure in clinic   PTRx Ther Proc 7 Side Stepping With Theraband   PTRx Ther Proc 7 - Details hep for strengthening VR in clinic   PTRx Ther Proc 8 Split Squat   PTRx Ther Proc 8 - Details 2 x 10 on R side in clinic with demonstration for set-up and foam roller to hold onto for balance   PTRx Ther Proc 9 Lateral Stepdown with Neutral Trunk Position   PTRx Ther Proc 9 - Details 2 x 10 on L side in clinic with demonstration for set-up and VC to keep hips  level   PTRx Ther Proc 10 Hip Hinge Using Dowel   PTRx Ther Proc 10 - Details hep for strengthening VR in clinic   PTRx Ther Proc 11 Stair Step Ups   PTRx Ther Proc 11 - Details x 10 on R side in clinic with demonstration for set-up   PTRx Ther Proc 12 Lateral Stepdown with Neutral Trunk Position   PTRx Ther Proc 12 - Details 2 x 10 on R side with demonstration for set-up and VC and TC for hips level and tap heel (trial with 4 inch step too difficult at present)   Skilled Intervention see above   Patient Response/Progress patient tolerated well   Therapeutic Activity   Therapeutic Activities: dynamic activities to improve functional performance minutes (97793) 8   Ther Act 1 patient education   Ther Act 1 - Details patient education on taking rest breaks while walking if start to notice walking with a bent knee   PTRx Ther Act 1 Icing   PTRx Ther Act 1 - Details VR in clinic hep for ice and compression/elevation for swelling reduction and pain relief    Skilled Intervention see above   Patient Response/Progress patient tolerated well and reports understanding   Neuromuscular Re-education   PTRx Neuro Re-ed 1 Proprioception At Counter Weight Shift   PTRx Neuro Re-ed 1 - Details VC for weight shift in each foot for squat   PTRx Neuro Re-ed 2 Single leg balance on a Pillow - Right Foot   PTRx Neuro Re-ed 2 - Details hep for balance   PTRx Neuro Re-ed 3 Single leg balance on a Pillow - Right Foot   PTRx Neuro Re-ed 3 - Details hep for balance   Intervention (Other)   PTRx Other  1 Standing Hip Flexion   PTRx Other 1 - Details hep as for gait qualit   PTRx Other  2 Step Overs: Forward   PTRx Other 2 - Details hep as for gait quality VR in clinic   Education   Learner/Method Patient   Plan   Home program see PTRX   Updates to plan of care see PN   Plan for next session continue: DL standing quad strength vs leg press, progress to SL strength as able; quad and hip strengthening   Total Session Time   Timed Code Treatment  Minutes 38   Total Treatment Time (sum of timed and untimed services) 38       [facility]                                                                                   OUTPATIENT PHYSICAL THERAPY    PLAN OF TREATMENT FOR OUTPATIENT REHABILITATION   Patient's Last Name, First Name, Jayne Mccurdy YOB: 1995   Provider's Name   [facility]   Medical Record No.  [id number]     Onset Date: 10/26/23 (date of surgery)  Start of Care Date: 11/16/23     Medical Diagnosis:  R knee pain      PT Treatment Diagnosis:    Plan of Treatment  Frequency/Duration: 1 x every other week progressing to 1 x every 3 weeks/ 2-3 months    Certification date from 11/16/23 to 02/08/24         See note for plan of treatment details and functional goals     Radha Galeana, PT                         I CERTIFY THE NEED FOR THESE SERVICES FURNISHED UNDER        THIS PLAN OF TREATMENT AND WHILE UNDER MY CARE     (Physician attestation of this document indicates review and certification of the therapy plan).              Referring Provider:  Artis Gomez    Initial Assessment  See Epic Evaluation- Start of Care Date: 11/16/23            PLAN  Continue therapy per current plan of care. See PN.    Beginning/End Dates of Progress Note Reporting Period:  12/21/2023 to 01/24/2024    Referring Provider:  Artis Gomez

## 2024-02-07 ENCOUNTER — THERAPY VISIT (OUTPATIENT)
Dept: PHYSICAL THERAPY | Facility: CLINIC | Age: 29
End: 2024-02-07
Payer: COMMERCIAL

## 2024-02-07 DIAGNOSIS — M25.561 RIGHT KNEE PAIN: Primary | ICD-10-CM

## 2024-02-07 PROCEDURE — 97530 THERAPEUTIC ACTIVITIES: CPT | Mod: GP

## 2024-02-07 PROCEDURE — 97110 THERAPEUTIC EXERCISES: CPT | Mod: GP

## 2024-05-06 PROBLEM — M25.561 RIGHT KNEE PAIN: Status: RESOLVED | Noted: 2023-11-16 | Resolved: 2024-05-06

## 2024-05-06 NOTE — PROGRESS NOTES
Patient did not return for further treatment and no additional progress was noted.  Please refer to the progress note and goal flowsheet completed on 1/24/2024  for discharge information.

## 2024-06-11 NOTE — BRIEF OP NOTE
[facility] [place]    Brief Operative Note    Pre-operative diagnosis: Mass of knee, right [R22.41]  Post-operative diagnosis Same as pre-operative diagnosis    Procedure: Right knee arthroscopic examination with two compartment synovectomy, chondroplasty, mass excision right knee, Right - Knee  EXCISION, MASS, LOWER EXTREMITY, Right - Leg    Surgeon: Surgeon(s) and Role:     * Artis Gomez MD - Primary     * Bing Guzmán MD - Resident - Assisting  Anesthesia: General   Estimated Blood Loss: 20 ml     Drains: None  Specimens:   ID Type Source Tests Collected by Time Destination   1 : Right knee synovian Tissue Knee, Right SURGICAL PATHOLOGY EXAM Artis Gomez MD 10/26/2023  3:10 PM    2 : Right knee mass Tissue Knee, Right SURGICAL PATHOLOGY EXAM Artis Gomez MD 10/26/2023  3:21 PM      Findings:   Inflamed synovium. Grade 2 chondral changes on medial femoral condyle. Grade 1 changes on medial tibial plateau, lateral tibial plateau, trochlea and lateral femoral condyle. Intact ACL, PCL. Mass removed from lateral recess adjacent to plateau (intraarticular) .  Complications: None.  Implants: * No implants in log *      Orthopedic Postoperative Plan:  - Activity: Up ad jana, ROMAT  - Weight bearing status: WBAT  - Antibiotics completed intra-op  - DVT ppx: Aspirin 162 x 2 weeks  - Pain control: local anesthetic injected at end of case. PRN ibuprofen and acetaminophen with 10 tabs oxycodone for breakthrough  - Dressing: Keep c/d/I x 5 days then okay to remove and shower. Rewrap ACE PRN. Steris will fall off.  - Follow up: 11/10 with Dr. Gomez  - Dispo: Discharge to home per PACU standard.    Bing Guzmán, PGY-4       Group Topic: BH Activity Group    Date: 6/11/2024  Start Time: 1700  End Time: 1730  Facilitators: Myriam Bardales RN; Kiki Grijalva RN    Focus: Thank You/Gratitude Card  Number in attendance: 4    Method: Group   Attendance: Present  Participation: Active  Patient Response: Attentive  Mood: Normal  Affect: Type: Euthymic (normal mood)   Range: Full (normal)   Congruency: Congruent   Stability: Stable  Behavior/Socialization: Appropriate to group and Cooperative  Thought Process: Focused and Goal-directed  Task Performance: Follows directions  Patient Evaluation: Independent - full participation      Patient created a thank you card and chose to write it for their mom and dad. Patient said she is grateful to them for always being there for her and for loving her.

## (undated) DEVICE — LINEN TOWEL PACK X5 5464

## (undated) DEVICE — PREP CHLORAPREP 26ML TINTED ORANGE  260815

## (undated) DEVICE — SU VICRYL 2-0 SH 27" UND J417H

## (undated) DEVICE — PREP POVIDONE-IODINE 7.5% SCRUB 4OZ BOTTLE MDS093945

## (undated) DEVICE — ESU GROUND PAD ADULT W/CORD E7507

## (undated) DEVICE — LINEN ORTHO PACK 5446

## (undated) DEVICE — DRAPE STERI U 1015

## (undated) DEVICE — PACK ARTHROSCOPY CUSTOM ASC

## (undated) DEVICE — SPECIMEN CONTAINER 5OZ STERILE 2600SA

## (undated) DEVICE — SOL NACL 0.9% IRRIG 3000ML BAG 2B7477

## (undated) DEVICE — SUCTION MANIFOLD NEPTUNE 2 SYS 1 PORT 702-025-000

## (undated) DEVICE — GLOVE BIOGEL PI MICRO SZ 7.0 48570

## (undated) DEVICE — SU MONOCRYL 4-0 PS-2 27" UND Y426H

## (undated) DEVICE — CAST PADDING 4" UNSTERILE 9044

## (undated) DEVICE — SOL NACL 0.9% IRRIG 500ML BOTTLE 2F7123

## (undated) DEVICE — DRAPE CONVERTORS U-DRAPE 60X72" 8476

## (undated) DEVICE — GOWN XLG DISP 9545

## (undated) DEVICE — PREP SKIN SCRUB TRAY 4461A

## (undated) RX ORDER — ACETAMINOPHEN 325 MG/1
TABLET ORAL
Status: DISPENSED
Start: 2023-10-26

## (undated) RX ORDER — CEFAZOLIN SODIUM 2 G/50ML
SOLUTION INTRAVENOUS
Status: DISPENSED
Start: 2023-10-26

## (undated) RX ORDER — OXYCODONE HYDROCHLORIDE 5 MG/1
TABLET ORAL
Status: DISPENSED
Start: 2023-10-26

## (undated) RX ORDER — PROPOFOL 10 MG/ML
INJECTION, EMULSION INTRAVENOUS
Status: DISPENSED
Start: 2023-10-26

## (undated) RX ORDER — APREPITANT 40 MG/1
CAPSULE ORAL
Status: DISPENSED
Start: 2023-10-26

## (undated) RX ORDER — FENTANYL CITRATE 50 UG/ML
INJECTION, SOLUTION INTRAMUSCULAR; INTRAVENOUS
Status: DISPENSED
Start: 2023-10-26

## (undated) RX ORDER — KETOROLAC TROMETHAMINE 30 MG/ML
INJECTION, SOLUTION INTRAMUSCULAR; INTRAVENOUS
Status: DISPENSED
Start: 2023-10-26

## (undated) RX ORDER — DEXAMETHASONE SODIUM PHOSPHATE 4 MG/ML
INJECTION, SOLUTION INTRA-ARTICULAR; INTRALESIONAL; INTRAMUSCULAR; INTRAVENOUS; SOFT TISSUE
Status: DISPENSED
Start: 2023-10-26

## (undated) RX ORDER — ONDANSETRON 2 MG/ML
INJECTION INTRAMUSCULAR; INTRAVENOUS
Status: DISPENSED
Start: 2023-10-26

## (undated) RX ORDER — SCOLOPAMINE TRANSDERMAL SYSTEM 1 MG/1
PATCH, EXTENDED RELEASE TRANSDERMAL
Status: DISPENSED
Start: 2023-10-26